# Patient Record
Sex: FEMALE | Race: WHITE | NOT HISPANIC OR LATINO
[De-identification: names, ages, dates, MRNs, and addresses within clinical notes are randomized per-mention and may not be internally consistent; named-entity substitution may affect disease eponyms.]

---

## 2021-02-24 PROBLEM — Z00.00 ENCOUNTER FOR PREVENTIVE HEALTH EXAMINATION: Status: ACTIVE | Noted: 2021-02-24

## 2021-03-01 ENCOUNTER — APPOINTMENT (OUTPATIENT)
Dept: SPINE | Facility: CLINIC | Age: 51
End: 2021-03-01
Payer: COMMERCIAL

## 2021-03-01 ENCOUNTER — RESULT REVIEW (OUTPATIENT)
Age: 51
End: 2021-03-01

## 2021-03-01 PROCEDURE — 99204 OFFICE O/P NEW MOD 45 MIN: CPT

## 2021-03-01 PROCEDURE — 99072 ADDL SUPL MATRL&STAF TM PHE: CPT

## 2021-03-02 VITALS — HEIGHT: 65 IN | BODY MASS INDEX: 23.32 KG/M2 | WEIGHT: 140 LBS

## 2021-03-02 NOTE — ASSESSMENT
[FreeTextEntry1] : Lumbar left sided radiculopathy .  Lumbar MRI  shows L 5 S 1 spondylolisthesis ,  grade II  with central and foraminal stenosis.  All non surgical measures were explained and may be considered.  Lumbar surgery was explained and if she  considers surgery a CT scan and flexions/extension lumbar xrays will be ordered.   Recovery and risks were explained along with alternatives.     \par \par \par \par CC\par Clint Mondragon MD\par [id number]\par [place] [address]

## 2021-03-02 NOTE — HISTORY OF PRESENT ILLNESS
[> 3 months] : more  than 3 months [FreeTextEntry1] : Lower back pain  [de-identified] : 50-year-old female who has been describing lower back pain since December 2020 and left leg pain .  The leg pain resolved spontaneously.  No fall or trauma.  Today the pain is reported at 7/10  No weakness.  No falls.  She has trouble with walking distances, standing,  and doing simple tasks.  No urine or stool incontinence.   She has no treatments for the back pain, no PT and no meds.  Lumbar MRI  shows L 5 S 1 spondylolisthesis ,  grade II  with central and foraminal stenosis.

## 2021-03-02 NOTE — CONSULT LETTER
[Dear  ___] : Dear  [unfilled], [Consult Letter:] : I had the pleasure of evaluating your patient, [unfilled]. [Please see my note below.] : Please see my note below. [Consult Closing:] : Thank you very much for allowing me to participate in the care of this patient.  If you have any questions, please do not hesitate to contact me. [Sincerely,] : Sincerely, [FreeTextEntry3] : Oswaldo Lundberg MD\par Chief of Neurosurgery [facility]\par [facility]\par

## 2021-03-02 NOTE — PHYSICAL EXAM
[Person] : oriented to person [Place] : oriented to place [Time] : oriented to time [Short Term Intact] : short term memory intact [Remote Intact] : remote memory intact [Span Intact] : the attention span was normal [Concentration Intact] : normal concentrating ability [Fluency] : fluency intact [Comprehension] : comprehension intact [Current Events] : adequate knowledge of current events [Past History] : adequate knowledge of personal past history [Vocabulary] : adequate range of vocabulary [Cranial Nerves Optic (II)] : visual acuity intact bilaterally,  pupils equal round and reactive to light [Cranial Nerves Oculomotor (III)] : extraocular motion intact [Cranial Nerves Trigeminal (V)] : facial sensation intact symmetrically [Cranial Nerves Facial (VII)] : face symmetrical [Cranial Nerves Vestibulocochlear (VIII)] : hearing was intact bilaterally [Cranial Nerves Glossopharyngeal (IX)] : tongue and palate midline [Cranial Nerves Accessory (XI - Cranial And Spinal)] : head turning and shoulder shrug symmetric [Cranial Nerves Hypoglossal (XII)] : there was no tongue deviation with protrusion [Motor Tone] : muscle tone was normal in all four extremities [Motor Strength] : muscle strength was normal in all four extremities [No Muscle Atrophy] : normal bulk in all four extremities [Sensation Tactile Decrease] : light touch was intact [Abnormal Walk] : normal gait [Balance] : balance was intact [2+] : Ankle jerk left 2+ [No Tenderness to Palpation] : no spine tenderness on palpation [Able to toe walk] : the patient was able to toe walk [Able to heel walk] : the patient was able to heel walk [Past-pointing] : there was no past-pointing [Tremor] : no tremor present [Plantar Reflex Right Only] : normal on the right [Plantar Reflex Left Only] : normal on the left [Deal] : Deal's sign was not demonstrated [Straight-Leg Raise Test - Left] : straight leg raise of the left leg was negative [Straight-Leg Raise Test - Right] : straight leg raise  of the right leg was negative

## 2021-03-02 NOTE — REASON FOR VISIT
[New Patient Visit] : a new patient visit [Referred By: _________] : Patient was referred by AKILA [FreeTextEntry1] : Lumbar Radiculopathy

## 2021-03-04 ENCOUNTER — TRANSCRIPTION ENCOUNTER (OUTPATIENT)
Age: 51
End: 2021-03-04

## 2021-03-22 ENCOUNTER — APPOINTMENT (OUTPATIENT)
Dept: CT IMAGING | Facility: IMAGING CENTER | Age: 51
End: 2021-03-22

## 2021-03-22 ENCOUNTER — APPOINTMENT (OUTPATIENT)
Dept: RADIOLOGY | Facility: IMAGING CENTER | Age: 51
End: 2021-03-22

## 2021-03-22 ENCOUNTER — OUTPATIENT (OUTPATIENT)
Dept: OUTPATIENT SERVICES | Facility: HOSPITAL | Age: 51
LOS: 1 days | End: 2021-03-22
Payer: COMMERCIAL

## 2021-03-22 VITALS
SYSTOLIC BLOOD PRESSURE: 132 MMHG | TEMPERATURE: 98 F | HEIGHT: 65 IN | WEIGHT: 210.98 LBS | HEART RATE: 80 BPM | DIASTOLIC BLOOD PRESSURE: 85 MMHG | RESPIRATION RATE: 14 BRPM | OXYGEN SATURATION: 98 %

## 2021-03-22 DIAGNOSIS — M54.16 RADICULOPATHY, LUMBAR REGION: ICD-10-CM

## 2021-03-22 DIAGNOSIS — Z98.891 HISTORY OF UTERINE SCAR FROM PREVIOUS SURGERY: Chronic | ICD-10-CM

## 2021-03-22 DIAGNOSIS — Z90.49 ACQUIRED ABSENCE OF OTHER SPECIFIED PARTS OF DIGESTIVE TRACT: Chronic | ICD-10-CM

## 2021-03-22 DIAGNOSIS — Z29.9 ENCOUNTER FOR PROPHYLACTIC MEASURES, UNSPECIFIED: ICD-10-CM

## 2021-03-22 DIAGNOSIS — Z01.818 ENCOUNTER FOR OTHER PREPROCEDURAL EXAMINATION: ICD-10-CM

## 2021-03-22 LAB
A1C WITH ESTIMATED AVERAGE GLUCOSE RESULT: 5.6 % — SIGNIFICANT CHANGE UP (ref 4–5.6)
ANION GAP SERPL CALC-SCNC: 10 MMOL/L — SIGNIFICANT CHANGE UP (ref 5–17)
BLD GP AB SCN SERPL QL: NEGATIVE — SIGNIFICANT CHANGE UP
BUN SERPL-MCNC: 11 MG/DL — SIGNIFICANT CHANGE UP (ref 7–23)
CALCIUM SERPL-MCNC: 9.3 MG/DL — SIGNIFICANT CHANGE UP (ref 8.4–10.5)
CHLORIDE SERPL-SCNC: 106 MMOL/L — SIGNIFICANT CHANGE UP (ref 96–108)
CO2 SERPL-SCNC: 25 MMOL/L — SIGNIFICANT CHANGE UP (ref 22–31)
CREAT SERPL-MCNC: 0.85 MG/DL — SIGNIFICANT CHANGE UP (ref 0.5–1.3)
ESTIMATED AVERAGE GLUCOSE: 114 MG/DL — SIGNIFICANT CHANGE UP (ref 68–114)
GLUCOSE SERPL-MCNC: 94 MG/DL — SIGNIFICANT CHANGE UP (ref 70–99)
HCT VFR BLD CALC: 43.7 % — SIGNIFICANT CHANGE UP (ref 34.5–45)
HGB BLD-MCNC: 14.1 G/DL — SIGNIFICANT CHANGE UP (ref 11.5–15.5)
MCHC RBC-ENTMCNC: 29.9 PG — SIGNIFICANT CHANGE UP (ref 27–34)
MCHC RBC-ENTMCNC: 32.3 GM/DL — SIGNIFICANT CHANGE UP (ref 32–36)
MCV RBC AUTO: 92.6 FL — SIGNIFICANT CHANGE UP (ref 80–100)
NRBC # BLD: 0 /100 WBCS — SIGNIFICANT CHANGE UP (ref 0–0)
PLATELET # BLD AUTO: 327 K/UL — SIGNIFICANT CHANGE UP (ref 150–400)
POTASSIUM SERPL-MCNC: 4.2 MMOL/L — SIGNIFICANT CHANGE UP (ref 3.5–5.3)
POTASSIUM SERPL-SCNC: 4.2 MMOL/L — SIGNIFICANT CHANGE UP (ref 3.5–5.3)
RBC # BLD: 4.72 M/UL — SIGNIFICANT CHANGE UP (ref 3.8–5.2)
RBC # FLD: 13.7 % — SIGNIFICANT CHANGE UP (ref 10.3–14.5)
RH IG SCN BLD-IMP: POSITIVE — SIGNIFICANT CHANGE UP
SODIUM SERPL-SCNC: 141 MMOL/L — SIGNIFICANT CHANGE UP (ref 135–145)
WBC # BLD: 9.19 K/UL — SIGNIFICANT CHANGE UP (ref 3.8–10.5)
WBC # FLD AUTO: 9.19 K/UL — SIGNIFICANT CHANGE UP (ref 3.8–10.5)

## 2021-03-22 PROCEDURE — 85027 COMPLETE CBC AUTOMATED: CPT

## 2021-03-22 PROCEDURE — 72131 CT LUMBAR SPINE W/O DYE: CPT

## 2021-03-22 PROCEDURE — 72082 X-RAY EXAM ENTIRE SPI 2/3 VW: CPT

## 2021-03-22 PROCEDURE — 86900 BLOOD TYPING SEROLOGIC ABO: CPT

## 2021-03-22 PROCEDURE — 86850 RBC ANTIBODY SCREEN: CPT

## 2021-03-22 PROCEDURE — G0463: CPT

## 2021-03-22 PROCEDURE — 80048 BASIC METABOLIC PNL TOTAL CA: CPT

## 2021-03-22 PROCEDURE — 83036 HEMOGLOBIN GLYCOSYLATED A1C: CPT

## 2021-03-22 PROCEDURE — 87640 STAPH A DNA AMP PROBE: CPT

## 2021-03-22 PROCEDURE — 72084 X-RAY EXAM ENTIRE SPI 6/> VW: CPT | Mod: 26

## 2021-03-22 PROCEDURE — 72131 CT LUMBAR SPINE W/O DYE: CPT | Mod: 26

## 2021-03-22 PROCEDURE — 86901 BLOOD TYPING SEROLOGIC RH(D): CPT

## 2021-03-22 PROCEDURE — 87641 MR-STAPH DNA AMP PROBE: CPT

## 2021-03-22 PROCEDURE — 72110 X-RAY EXAM L-2 SPINE 4/>VWS: CPT

## 2021-03-22 RX ORDER — CEFAZOLIN SODIUM 1 G
2000 VIAL (EA) INJECTION ONCE
Refills: 0 | Status: DISCONTINUED | OUTPATIENT
Start: 2021-03-30 | End: 2021-04-01

## 2021-03-22 NOTE — H&P PST ADULT - HEIGHT IN CM
[address] [place] [address].    DISCHARGE SUMMARY     Patient: Radha Moore                             Medical Record Number: [id number]                : 1966  Age: 46 y.o. Admit Date: 2018  Discharge Date: 2018  Admission Diagnosis: HNP   HNP (herniated nucleus pulposus), lumbar  Herniated nucleus pulposus  Discharge Diagnosis: HERNIATED NUCLEUS PULPOSA  Procedures: Procedure(s):  LUMBAR JUAN MANUEL-LAMINECTOMY DISCECTOMY L5-S1  Surgeon: Deny Swain MD  Anesthesia: general  Complications: None       Hospital Course:  Radha Moore was admitted the same day of surgery and underwent Procedure(s):  LUMBAR JUAN MANUEL-LAMINECTOMY DISCECTOMY L5-S1 and tolerated the procedure well. She was transferred  to the recovery room in stable condition. After a brief stay the patient was then transferred to the Spine Surgery Unit at [address] [place] [address].  On postoperative day #1, the dressing was clean and dry, she was neurovascularly intact. The patient was afebrile and vital signs were stable. Calves were soft and non-tender bilaterally. On postoperative day  # 1, the patient was tolerating a regular diet and making satisfactory progress with physical therapy. Hemoglobin and INR prior to discharge were   Lab Results   Component Value Date/Time    HGB 11.1 (L) 2018 04:36 AM    INR 1.0 2018 02:14 PM   .  Radha Moore made satisfactory progress with physical therapy and was discharged to Home in stable condition on postoperative day #1. She was provided with routine postoperative instructions and advised to follow up in my office in 2 weeks following discharge from the hospital.  She was prescribed pain medication for post operative analgesia.      Discharge Medications:  Current Discharge Medication List      START taking these medications    Details   oxyCODONE IR (ROXICODONE) 5 mg immediate release tablet Take 1 Tab by mouth every three (3) hours as needed. Max Daily Amount: 40 mg.  Qty: 50 Tab, Refills: 0    Associated Diagnoses: HNP (herniated nucleus pulposus), lumbar      senna-docusate (PERICOLACE) 8.6-50 mg per tablet Take 1 Tab by mouth two (2) times a day. Qty: 60 Tab, Refills: 0         CONTINUE these medications which have NOT CHANGED    Details   acetaminophen (TYLENOL EXTRA STRENGTH) 500 mg tablet Take 1,000 mg by mouth every six (6) hours as needed for Pain. naproxen sodium (ALEVE) 220 mg tablet Take 220 mg by mouth as needed.              Signed by: Piper Siddiqi PA-C  6/20/2018 165.1

## 2021-03-22 NOTE — H&P PST ADULT - ASSESSMENT
KIRSTENI VTE 2.0 SCORE [CLOT updated 2019]    AGE RELATED RISK FACTORS                                                       MOBILITY RELATED FACTORS  [ ] Age 41-60 years                                            (1 Point)                    [ ] Bed rest                                                        (1 Point)  [ ] Age: 61-74 years                                           (2 Points)                  [ ] Plaster cast                                                   (2 Points)  [ ] Age= 75 years                                              (3 Points)                    [ ] Bed bound for more than 72 hours                 (2 Points)    DISEASE RELATED RISK FACTORS                                               GENDER SPECIFIC FACTORS  [ ] Edema in the lower extremities                       (1 Point)              [ ] Pregnancy                                                     (1 Point)  [ ] Varicose veins                                               (1 Point)                     [ ] Post-partum < 6 weeks                                   (1 Point)             [ ] BMI > 25 Kg/m2                                            (1 Point)                     [ ] Hormonal therapy  or oral contraception          (1 Point)                 [ ] Sepsis (in the previous month)                        (1 Point)               [ ] History of pregnancy complications                 (1 point)  [ ] Pneumonia or serious lung disease                                               [ ] Unexplained or recurrent                     (1 Point)           (in the previous month)                               (1 Point)  [ ] Abnormal pulmonary function test                     (1 Point)                 SURGERY RELATED RISK FACTORS  [ ] Acute myocardial infarction                              (1 Point)               [ ]  Section                                             (1 Point)  [ ] Congestive heart failure (in the previous month)  (1 Point)      [ ] Minor surgery                                                  (1 Point)   [ ] Inflammatory bowel disease                             (1 Point)               [ ] Arthroscopic surgery                                        (2 Points)  [ ] Central venous access                                      (2 Points)                [ ] General surgery lasting more than 45 minutes (2 points)  [ ] Malignancy- Present or previous                   (2 Points)                [ ] Elective arthroplasty                                         (5 points)    [ ] Stroke (in the previous month)                          (5 Points)                                                                                                                                                           HEMATOLOGY RELATED FACTORS                                                 TRAUMA RELATED RISK FACTORS  [ ] Prior episodes of VTE                                     (3 Points)                [ ] Fracture of the hip, pelvis, or leg                       (5 Points)  [ ] Positive family history for VTE                         (3 Points)             [ ] Acute spinal cord injury (in the previous month)  (5 Points)  [ ] Prothrombin 12446 A                                     (3 Points)               [ ] Paralysis  (less than 1 month)                             (5 Points)  [ ] Factor V Leiden                                             (3 Points)                  [ ] Multiple Trauma within 1 month                        (5 Points)  [ ] Lupus anticoagulants                                     (3 Points)                                                           [ ] Anticardiolipin antibodies                               (3 Points)                                                       [ ] High homocysteine in the blood                      (3 Points)                                             [ ] Other congenital or acquired thrombophilia      (3 Points)                                                [ ] Heparin induced thrombocytopenia                  (3 Points)                                     Total Score [    3      ]

## 2021-03-22 NOTE — H&P PST ADULT - NSICDXPROBLEM_GEN_ALL_CORE_FT
PROBLEM DIAGNOSES  Problem: Lumbar radiculopathy  Assessment and Plan: Scheduled for L5-S1 Posterior Lumbar Fusion    Problem: Need for prophylactic measure  Assessment and Plan: The Caprini score indicates this patient is at risk for a VTE event (score 3-5).  Most surgical patients in this group would benefit from pharmacologic prophylaxis.  The surgical team will determine the balance between VTE risk and bleeding risk

## 2021-03-22 NOTE — H&P PST ADULT - HISTORY OF PRESENT ILLNESS
51 y/o HDL and cholecystectomy () and  28 years ago. Endorsed low back pain and on and off LLE pain for a few years but gotten worse since 2021.  Saw PCP and referred to Dr. Lundberg, MRI + for L2-S1 spondylolisthesis with central foramina stenosis. Pt reports 2/10 pain today. Denies fecal and urinary incontinence.    Denies Covid 19 infection, sick contacts or recent travel.   Echo and stress test  as part of regular physical exam

## 2021-03-23 PROBLEM — E78.5 HYPERLIPIDEMIA, UNSPECIFIED: Chronic | Status: ACTIVE | Noted: 2021-03-22

## 2021-03-23 LAB
MRSA PCR RESULT.: SIGNIFICANT CHANGE UP
S AUREUS DNA NOSE QL NAA+PROBE: SIGNIFICANT CHANGE UP

## 2021-03-27 ENCOUNTER — OUTPATIENT (OUTPATIENT)
Dept: OUTPATIENT SERVICES | Facility: HOSPITAL | Age: 51
LOS: 1 days | End: 2021-03-27
Payer: COMMERCIAL

## 2021-03-27 DIAGNOSIS — Z98.891 HISTORY OF UTERINE SCAR FROM PREVIOUS SURGERY: Chronic | ICD-10-CM

## 2021-03-27 DIAGNOSIS — Z90.49 ACQUIRED ABSENCE OF OTHER SPECIFIED PARTS OF DIGESTIVE TRACT: Chronic | ICD-10-CM

## 2021-03-27 DIAGNOSIS — Z11.52 ENCOUNTER FOR SCREENING FOR COVID-19: ICD-10-CM

## 2021-03-27 LAB — SARS-COV-2 RNA SPEC QL NAA+PROBE: SIGNIFICANT CHANGE UP

## 2021-03-27 PROCEDURE — C9803: CPT

## 2021-03-27 PROCEDURE — U0005: CPT

## 2021-03-27 PROCEDURE — U0003: CPT

## 2021-03-29 ENCOUNTER — TRANSCRIPTION ENCOUNTER (OUTPATIENT)
Age: 51
End: 2021-03-29

## 2021-03-30 ENCOUNTER — APPOINTMENT (OUTPATIENT)
Dept: SPINE | Facility: HOSPITAL | Age: 51
End: 2021-03-30

## 2021-03-30 ENCOUNTER — INPATIENT (INPATIENT)
Facility: HOSPITAL | Age: 51
LOS: 4 days | Discharge: HOME | DRG: 454 | End: 2021-04-04
Attending: NEUROLOGICAL SURGERY | Admitting: NEUROLOGICAL SURGERY
Payer: COMMERCIAL

## 2021-03-30 VITALS
HEART RATE: 71 BPM | RESPIRATION RATE: 16 BRPM | TEMPERATURE: 98 F | WEIGHT: 210.98 LBS | SYSTOLIC BLOOD PRESSURE: 119 MMHG | OXYGEN SATURATION: 98 % | DIASTOLIC BLOOD PRESSURE: 76 MMHG | HEIGHT: 65 IN

## 2021-03-30 DIAGNOSIS — M54.16 RADICULOPATHY, LUMBAR REGION: ICD-10-CM

## 2021-03-30 DIAGNOSIS — Z90.49 ACQUIRED ABSENCE OF OTHER SPECIFIED PARTS OF DIGESTIVE TRACT: Chronic | ICD-10-CM

## 2021-03-30 DIAGNOSIS — Z98.891 HISTORY OF UTERINE SCAR FROM PREVIOUS SURGERY: Chronic | ICD-10-CM

## 2021-03-30 LAB
ANION GAP SERPL CALC-SCNC: 12 MMOL/L — SIGNIFICANT CHANGE UP (ref 5–17)
BASOPHILS # BLD AUTO: 0.02 K/UL — SIGNIFICANT CHANGE UP (ref 0–0.2)
BASOPHILS NFR BLD AUTO: 0.1 % — SIGNIFICANT CHANGE UP (ref 0–2)
BUN SERPL-MCNC: 12 MG/DL — SIGNIFICANT CHANGE UP (ref 7–23)
CALCIUM SERPL-MCNC: 8.2 MG/DL — LOW (ref 8.4–10.5)
CHLORIDE SERPL-SCNC: 104 MMOL/L — SIGNIFICANT CHANGE UP (ref 96–108)
CO2 SERPL-SCNC: 21 MMOL/L — LOW (ref 22–31)
CREAT SERPL-MCNC: 0.71 MG/DL — SIGNIFICANT CHANGE UP (ref 0.5–1.3)
EOSINOPHIL # BLD AUTO: 0.01 K/UL — SIGNIFICANT CHANGE UP (ref 0–0.5)
EOSINOPHIL NFR BLD AUTO: 0.1 % — SIGNIFICANT CHANGE UP (ref 0–6)
GLUCOSE BLDC GLUCOMTR-MCNC: 97 MG/DL — SIGNIFICANT CHANGE UP (ref 70–99)
GLUCOSE SERPL-MCNC: 147 MG/DL — HIGH (ref 70–99)
HCG UR QL: NEGATIVE — SIGNIFICANT CHANGE UP
HCT VFR BLD CALC: 36.2 % — SIGNIFICANT CHANGE UP (ref 34.5–45)
HCT VFR BLD CALC: 37.2 % — SIGNIFICANT CHANGE UP (ref 34.5–45)
HGB BLD-MCNC: 11.7 G/DL — SIGNIFICANT CHANGE UP (ref 11.5–15.5)
HGB BLD-MCNC: 11.9 G/DL — SIGNIFICANT CHANGE UP (ref 11.5–15.5)
IMM GRANULOCYTES NFR BLD AUTO: 0.7 % — SIGNIFICANT CHANGE UP (ref 0–1.5)
LYMPHOCYTES # BLD AUTO: 1.5 K/UL — SIGNIFICANT CHANGE UP (ref 1–3.3)
LYMPHOCYTES # BLD AUTO: 9.9 % — LOW (ref 13–44)
MCHC RBC-ENTMCNC: 29.5 PG — SIGNIFICANT CHANGE UP (ref 27–34)
MCHC RBC-ENTMCNC: 29.9 PG — SIGNIFICANT CHANGE UP (ref 27–34)
MCHC RBC-ENTMCNC: 32 GM/DL — SIGNIFICANT CHANGE UP (ref 32–36)
MCHC RBC-ENTMCNC: 32.3 GM/DL — SIGNIFICANT CHANGE UP (ref 32–36)
MCV RBC AUTO: 92.1 FL — SIGNIFICANT CHANGE UP (ref 80–100)
MCV RBC AUTO: 92.6 FL — SIGNIFICANT CHANGE UP (ref 80–100)
MONOCYTES # BLD AUTO: 0.16 K/UL — SIGNIFICANT CHANGE UP (ref 0–0.9)
MONOCYTES NFR BLD AUTO: 1.1 % — LOW (ref 2–14)
NEUTROPHILS # BLD AUTO: 13.3 K/UL — HIGH (ref 1.8–7.4)
NEUTROPHILS NFR BLD AUTO: 88.1 % — HIGH (ref 43–77)
NRBC # BLD: 0 /100 WBCS — SIGNIFICANT CHANGE UP (ref 0–0)
NRBC # BLD: 0 /100 WBCS — SIGNIFICANT CHANGE UP (ref 0–0)
PLATELET # BLD AUTO: 265 K/UL — SIGNIFICANT CHANGE UP (ref 150–400)
PLATELET # BLD AUTO: 286 K/UL — SIGNIFICANT CHANGE UP (ref 150–400)
POTASSIUM SERPL-MCNC: 4.1 MMOL/L — SIGNIFICANT CHANGE UP (ref 3.5–5.3)
POTASSIUM SERPL-SCNC: 4.1 MMOL/L — SIGNIFICANT CHANGE UP (ref 3.5–5.3)
RBC # BLD: 3.91 M/UL — SIGNIFICANT CHANGE UP (ref 3.8–5.2)
RBC # BLD: 4.04 M/UL — SIGNIFICANT CHANGE UP (ref 3.8–5.2)
RBC # FLD: 13.3 % — SIGNIFICANT CHANGE UP (ref 10.3–14.5)
RBC # FLD: 13.4 % — SIGNIFICANT CHANGE UP (ref 10.3–14.5)
RH IG SCN BLD-IMP: POSITIVE — SIGNIFICANT CHANGE UP
SODIUM SERPL-SCNC: 137 MMOL/L — SIGNIFICANT CHANGE UP (ref 135–145)
WBC # BLD: 13.38 K/UL — HIGH (ref 3.8–10.5)
WBC # BLD: 15.09 K/UL — HIGH (ref 3.8–10.5)
WBC # FLD AUTO: 13.38 K/UL — HIGH (ref 3.8–10.5)
WBC # FLD AUTO: 15.09 K/UL — HIGH (ref 3.8–10.5)

## 2021-03-30 PROCEDURE — 63047 LAM FACETEC & FORAMOT LUMBAR: CPT | Mod: 59,GC

## 2021-03-30 PROCEDURE — 22614 ARTHRD PST TQ 1NTRSPC EA ADD: CPT | Mod: GC

## 2021-03-30 PROCEDURE — 63048 LAM FACETEC &FORAMOT EA ADDL: CPT | Mod: 59,GC

## 2021-03-30 PROCEDURE — 22842 INSERT SPINE FIXATION DEVICE: CPT | Mod: GC

## 2021-03-30 PROCEDURE — 22633 ARTHRD CMBN 1NTRSPC LUMBAR: CPT | Mod: GC

## 2021-03-30 PROCEDURE — 22853 INSJ BIOMECHANICAL DEVICE: CPT | Mod: GC

## 2021-03-30 RX ORDER — ACETAMINOPHEN 500 MG
1000 TABLET ORAL EVERY 6 HOURS
Refills: 0 | Status: DISCONTINUED | OUTPATIENT
Start: 2021-03-30 | End: 2021-03-30

## 2021-03-30 RX ORDER — SODIUM CHLORIDE 9 MG/ML
500 INJECTION INTRAMUSCULAR; INTRAVENOUS; SUBCUTANEOUS ONCE
Refills: 0 | Status: COMPLETED | OUTPATIENT
Start: 2021-03-30 | End: 2021-03-30

## 2021-03-30 RX ORDER — INFLUENZA VIRUS VACCINE 15; 15; 15; 15 UG/.5ML; UG/.5ML; UG/.5ML; UG/.5ML
0.5 SUSPENSION INTRAMUSCULAR ONCE
Refills: 0 | Status: COMPLETED | OUTPATIENT
Start: 2021-03-30 | End: 2021-03-30

## 2021-03-30 RX ORDER — CHLORHEXIDINE GLUCONATE 213 G/1000ML
1 SOLUTION TOPICAL ONCE
Refills: 0 | Status: COMPLETED | OUTPATIENT
Start: 2021-03-30 | End: 2021-03-30

## 2021-03-30 RX ORDER — SENNA PLUS 8.6 MG/1
2 TABLET ORAL AT BEDTIME
Refills: 0 | Status: DISCONTINUED | OUTPATIENT
Start: 2021-03-30 | End: 2021-04-04

## 2021-03-30 RX ORDER — DEXTROSE MONOHYDRATE, SODIUM CHLORIDE, AND POTASSIUM CHLORIDE 50; .745; 4.5 G/1000ML; G/1000ML; G/1000ML
1000 INJECTION, SOLUTION INTRAVENOUS
Refills: 0 | Status: ACTIVE | OUTPATIENT
Start: 2021-03-30 | End: 2022-02-26

## 2021-03-30 RX ORDER — HYDROMORPHONE HYDROCHLORIDE 2 MG/ML
0.5 INJECTION INTRAMUSCULAR; INTRAVENOUS; SUBCUTANEOUS
Refills: 0 | Status: DISCONTINUED | OUTPATIENT
Start: 2021-03-30 | End: 2021-03-31

## 2021-03-30 RX ORDER — ONDANSETRON 8 MG/1
4 TABLET, FILM COATED ORAL EVERY 6 HOURS
Refills: 0 | Status: DISCONTINUED | OUTPATIENT
Start: 2021-03-30 | End: 2021-04-04

## 2021-03-30 RX ORDER — LIDOCAINE HCL 20 MG/ML
0.2 VIAL (ML) INJECTION ONCE
Refills: 0 | Status: COMPLETED | OUTPATIENT
Start: 2021-03-30 | End: 2021-03-30

## 2021-03-30 RX ORDER — HYDROMORPHONE HYDROCHLORIDE 2 MG/ML
30 INJECTION INTRAMUSCULAR; INTRAVENOUS; SUBCUTANEOUS
Refills: 0 | Status: DISCONTINUED | OUTPATIENT
Start: 2021-03-30 | End: 2021-03-31

## 2021-03-30 RX ORDER — SODIUM CHLORIDE 9 MG/ML
3 INJECTION INTRAMUSCULAR; INTRAVENOUS; SUBCUTANEOUS EVERY 8 HOURS
Refills: 0 | Status: DISCONTINUED | OUTPATIENT
Start: 2021-03-30 | End: 2021-03-30

## 2021-03-30 RX ORDER — ENOXAPARIN SODIUM 100 MG/ML
40 INJECTION SUBCUTANEOUS DAILY
Refills: 0 | Status: DISCONTINUED | OUTPATIENT
Start: 2021-03-31 | End: 2021-04-04

## 2021-03-30 RX ORDER — HYDROMORPHONE HYDROCHLORIDE 2 MG/ML
0.5 INJECTION INTRAMUSCULAR; INTRAVENOUS; SUBCUTANEOUS
Refills: 0 | Status: DISCONTINUED | OUTPATIENT
Start: 2021-03-30 | End: 2021-03-30

## 2021-03-30 RX ORDER — ACETAMINOPHEN 500 MG
650 TABLET ORAL EVERY 6 HOURS
Refills: 0 | Status: DISCONTINUED | OUTPATIENT
Start: 2021-03-30 | End: 2021-04-02

## 2021-03-30 RX ORDER — METOCLOPRAMIDE HCL 10 MG
10 TABLET ORAL ONCE
Refills: 0 | Status: COMPLETED | OUTPATIENT
Start: 2021-03-30 | End: 2021-03-30

## 2021-03-30 RX ORDER — ONDANSETRON 8 MG/1
4 TABLET, FILM COATED ORAL ONCE
Refills: 0 | Status: DISCONTINUED | OUTPATIENT
Start: 2021-03-30 | End: 2021-03-30

## 2021-03-30 RX ORDER — NALOXONE HYDROCHLORIDE 4 MG/.1ML
0.1 SPRAY NASAL
Refills: 0 | Status: DISCONTINUED | OUTPATIENT
Start: 2021-03-30 | End: 2021-03-31

## 2021-03-30 RX ORDER — DIAZEPAM 5 MG
5 TABLET ORAL EVERY 6 HOURS
Refills: 0 | Status: DISCONTINUED | OUTPATIENT
Start: 2021-03-30 | End: 2021-04-02

## 2021-03-30 RX ORDER — CEFAZOLIN SODIUM 1 G
2000 VIAL (EA) INJECTION EVERY 8 HOURS
Refills: 0 | Status: COMPLETED | OUTPATIENT
Start: 2021-03-30 | End: 2021-03-31

## 2021-03-30 RX ADMIN — HYDROMORPHONE HYDROCHLORIDE 0.5 MILLIGRAM(S): 2 INJECTION INTRAMUSCULAR; INTRAVENOUS; SUBCUTANEOUS at 14:25

## 2021-03-30 RX ADMIN — HYDROMORPHONE HYDROCHLORIDE 30 MILLILITER(S): 2 INJECTION INTRAMUSCULAR; INTRAVENOUS; SUBCUTANEOUS at 17:18

## 2021-03-30 RX ADMIN — CHLORHEXIDINE GLUCONATE 1 APPLICATION(S): 213 SOLUTION TOPICAL at 07:06

## 2021-03-30 RX ADMIN — ONDANSETRON 4 MILLIGRAM(S): 8 TABLET, FILM COATED ORAL at 18:13

## 2021-03-30 RX ADMIN — SODIUM CHLORIDE 3 MILLILITER(S): 9 INJECTION INTRAMUSCULAR; INTRAVENOUS; SUBCUTANEOUS at 07:07

## 2021-03-30 RX ADMIN — DEXTROSE MONOHYDRATE, SODIUM CHLORIDE, AND POTASSIUM CHLORIDE 75 MILLILITER(S): 50; .745; 4.5 INJECTION, SOLUTION INTRAVENOUS at 16:36

## 2021-03-30 RX ADMIN — HYDROMORPHONE HYDROCHLORIDE 30 MILLILITER(S): 2 INJECTION INTRAMUSCULAR; INTRAVENOUS; SUBCUTANEOUS at 19:35

## 2021-03-30 RX ADMIN — HYDROMORPHONE HYDROCHLORIDE 0.5 MILLIGRAM(S): 2 INJECTION INTRAMUSCULAR; INTRAVENOUS; SUBCUTANEOUS at 14:10

## 2021-03-30 RX ADMIN — HYDROMORPHONE HYDROCHLORIDE 30 MILLILITER(S): 2 INJECTION INTRAMUSCULAR; INTRAVENOUS; SUBCUTANEOUS at 16:37

## 2021-03-30 RX ADMIN — Medication 100 MILLIGRAM(S): at 23:09

## 2021-03-30 RX ADMIN — Medication 0.2 MILLILITER(S): at 07:06

## 2021-03-30 RX ADMIN — SODIUM CHLORIDE 500 MILLILITER(S): 9 INJECTION INTRAMUSCULAR; INTRAVENOUS; SUBCUTANEOUS at 21:05

## 2021-03-30 RX ADMIN — Medication 10 MILLIGRAM(S): at 20:06

## 2021-03-30 RX ADMIN — HYDROMORPHONE HYDROCHLORIDE 30 MILLILITER(S): 2 INJECTION INTRAMUSCULAR; INTRAVENOUS; SUBCUTANEOUS at 14:16

## 2021-03-30 NOTE — PHYSICAL THERAPY INITIAL EVALUATION ADULT - BALANCE TRAINING, PT EVAL
GOAL: Pt will increase static/dynamic sitting and standing balance to at least "good" within 2 weeks

## 2021-03-30 NOTE — PHYSICAL THERAPY INITIAL EVALUATION ADULT - PLANNED THERAPY INTERVENTIONS, PT EVAL
GOAL: Pt will negotiate 9 steps with unilateral handrail in a step-to pattern independently within 2 weeks/balance training/bed mobility training/gait training/strengthening/transfer training

## 2021-03-30 NOTE — PHYSICAL THERAPY INITIAL EVALUATION ADULT - BED MOBILITY LIMITATIONS, REHAB EVAL
supine to sit via logroll/decreased ability to use legs for bridging/pushing/impaired ability to control trunk for mobility

## 2021-03-30 NOTE — PHYSICAL THERAPY INITIAL EVALUATION ADULT - ADDITIONAL COMMENTS
pt states she lives in a private home with family with a flight of steps to enter and a flight of steps inside (+handrail). Pt states prior to admission being independent with all functional mobility and ADLs. pt denies using any devices for ambulation. pt reports driving and working prior to admission.

## 2021-03-30 NOTE — PRE-ANESTHESIA EVALUATION ADULT - NSANTHPMHFT_GEN_ALL_CORE
low back pain and on and off LLE pain for a few years but gotten worse since 12/2021. No neurological symptoms. Intermittent LLE pain for a few years but has gotten worse since 12/2021. Denies neurological symptoms.

## 2021-03-30 NOTE — PHYSICAL THERAPY INITIAL EVALUATION ADULT - GENERAL OBSERVATIONS, REHAB EVAL
pt silvana 35 min eval poor. pt rec'd in bed, hemovac, PCA, peripheral IV line, 2L NC, pulse ox, NAD. pt appeared tired, falling asleep at times but arousable.

## 2021-03-30 NOTE — PRE-ANESTHESIA EVALUATION ADULT - NSANTHOSAYNRD_GEN_A_CORE
No. BLACK screening performed.  STOP BANG Legend: 0-2 = LOW Risk; 3-4 = INTERMEDIATE Risk; 5-8 = HIGH Risk

## 2021-03-31 LAB
ANION GAP SERPL CALC-SCNC: 8 MMOL/L — SIGNIFICANT CHANGE UP (ref 5–17)
BASOPHILS # BLD AUTO: 0.01 K/UL — SIGNIFICANT CHANGE UP (ref 0–0.2)
BASOPHILS NFR BLD AUTO: 0.1 % — SIGNIFICANT CHANGE UP (ref 0–2)
BUN SERPL-MCNC: 11 MG/DL — SIGNIFICANT CHANGE UP (ref 7–23)
CALCIUM SERPL-MCNC: 8.6 MG/DL — SIGNIFICANT CHANGE UP (ref 8.4–10.5)
CHLORIDE SERPL-SCNC: 107 MMOL/L — SIGNIFICANT CHANGE UP (ref 96–108)
CO2 SERPL-SCNC: 25 MMOL/L — SIGNIFICANT CHANGE UP (ref 22–31)
COVID-19 SPIKE DOMAIN AB INTERP: NEGATIVE — SIGNIFICANT CHANGE UP
COVID-19 SPIKE DOMAIN ANTIBODY RESULT: 0.4 U/ML — SIGNIFICANT CHANGE UP
CREAT SERPL-MCNC: 0.71 MG/DL — SIGNIFICANT CHANGE UP (ref 0.5–1.3)
EOSINOPHIL # BLD AUTO: 0 K/UL — SIGNIFICANT CHANGE UP (ref 0–0.5)
EOSINOPHIL NFR BLD AUTO: 0 % — SIGNIFICANT CHANGE UP (ref 0–6)
GLUCOSE SERPL-MCNC: 115 MG/DL — HIGH (ref 70–99)
HCT VFR BLD CALC: 33.4 % — LOW (ref 34.5–45)
HGB BLD-MCNC: 10.5 G/DL — LOW (ref 11.5–15.5)
IMM GRANULOCYTES NFR BLD AUTO: 0.6 % — SIGNIFICANT CHANGE UP (ref 0–1.5)
LYMPHOCYTES # BLD AUTO: 1.8 K/UL — SIGNIFICANT CHANGE UP (ref 1–3.3)
LYMPHOCYTES # BLD AUTO: 14.2 % — SIGNIFICANT CHANGE UP (ref 13–44)
MCHC RBC-ENTMCNC: 29.3 PG — SIGNIFICANT CHANGE UP (ref 27–34)
MCHC RBC-ENTMCNC: 31.4 GM/DL — LOW (ref 32–36)
MCV RBC AUTO: 93.3 FL — SIGNIFICANT CHANGE UP (ref 80–100)
MONOCYTES # BLD AUTO: 0.72 K/UL — SIGNIFICANT CHANGE UP (ref 0–0.9)
MONOCYTES NFR BLD AUTO: 5.7 % — SIGNIFICANT CHANGE UP (ref 2–14)
NEUTROPHILS # BLD AUTO: 10.12 K/UL — HIGH (ref 1.8–7.4)
NEUTROPHILS NFR BLD AUTO: 79.4 % — HIGH (ref 43–77)
NRBC # BLD: 0 /100 WBCS — SIGNIFICANT CHANGE UP (ref 0–0)
PLATELET # BLD AUTO: 280 K/UL — SIGNIFICANT CHANGE UP (ref 150–400)
POTASSIUM SERPL-MCNC: 4.8 MMOL/L — SIGNIFICANT CHANGE UP (ref 3.5–5.3)
POTASSIUM SERPL-SCNC: 4.8 MMOL/L — SIGNIFICANT CHANGE UP (ref 3.5–5.3)
RBC # BLD: 3.58 M/UL — LOW (ref 3.8–5.2)
RBC # FLD: 13.6 % — SIGNIFICANT CHANGE UP (ref 10.3–14.5)
SARS-COV-2 IGG+IGM SERPL QL IA: 0.4 U/ML — SIGNIFICANT CHANGE UP
SARS-COV-2 IGG+IGM SERPL QL IA: NEGATIVE — SIGNIFICANT CHANGE UP
SODIUM SERPL-SCNC: 140 MMOL/L — SIGNIFICANT CHANGE UP (ref 135–145)
WBC # BLD: 12.72 K/UL — HIGH (ref 3.8–10.5)
WBC # FLD AUTO: 12.72 K/UL — HIGH (ref 3.8–10.5)

## 2021-03-31 PROCEDURE — 93970 EXTREMITY STUDY: CPT | Mod: 26

## 2021-03-31 RX ORDER — OXYCODONE HYDROCHLORIDE 5 MG/1
5 TABLET ORAL EVERY 4 HOURS
Refills: 0 | Status: DISCONTINUED | OUTPATIENT
Start: 2021-03-31 | End: 2021-04-02

## 2021-03-31 RX ORDER — POLYETHYLENE GLYCOL 3350 17 G/17G
17 POWDER, FOR SOLUTION ORAL
Refills: 0 | Status: DISCONTINUED | OUTPATIENT
Start: 2021-03-31 | End: 2021-04-02

## 2021-03-31 RX ORDER — OXYCODONE HYDROCHLORIDE 5 MG/1
10 TABLET ORAL EVERY 4 HOURS
Refills: 0 | Status: DISCONTINUED | OUTPATIENT
Start: 2021-03-31 | End: 2021-04-02

## 2021-03-31 RX ORDER — HYDROMORPHONE HYDROCHLORIDE 2 MG/ML
0.5 INJECTION INTRAMUSCULAR; INTRAVENOUS; SUBCUTANEOUS
Refills: 0 | Status: DISCONTINUED | OUTPATIENT
Start: 2021-03-31 | End: 2021-04-01

## 2021-03-31 RX ADMIN — OXYCODONE HYDROCHLORIDE 10 MILLIGRAM(S): 5 TABLET ORAL at 21:58

## 2021-03-31 RX ADMIN — OXYCODONE HYDROCHLORIDE 10 MILLIGRAM(S): 5 TABLET ORAL at 17:03

## 2021-03-31 RX ADMIN — OXYCODONE HYDROCHLORIDE 10 MILLIGRAM(S): 5 TABLET ORAL at 17:26

## 2021-03-31 RX ADMIN — OXYCODONE HYDROCHLORIDE 10 MILLIGRAM(S): 5 TABLET ORAL at 13:42

## 2021-03-31 RX ADMIN — Medication 100 MILLIGRAM(S): at 05:21

## 2021-03-31 RX ADMIN — ENOXAPARIN SODIUM 40 MILLIGRAM(S): 100 INJECTION SUBCUTANEOUS at 12:59

## 2021-03-31 RX ADMIN — Medication 100 MILLIGRAM(S): at 13:17

## 2021-03-31 RX ADMIN — OXYCODONE HYDROCHLORIDE 10 MILLIGRAM(S): 5 TABLET ORAL at 22:30

## 2021-03-31 RX ADMIN — OXYCODONE HYDROCHLORIDE 10 MILLIGRAM(S): 5 TABLET ORAL at 13:16

## 2021-03-31 RX ADMIN — HYDROMORPHONE HYDROCHLORIDE 30 MILLILITER(S): 2 INJECTION INTRAMUSCULAR; INTRAVENOUS; SUBCUTANEOUS at 07:39

## 2021-03-31 NOTE — PROGRESS NOTE ADULT - SUBJECTIVE AND OBJECTIVE BOX
Post-op day 3 1 s/p L4-S1 PLIF    OVERNIGHT EVENTS: no acute event    Vital Signs Last 24 Hrs  T(C): 36.8 (31 Mar 2021 04:31), Max: 36.9 (30 Mar 2021 13:55)  T(F): 98.2 (31 Mar 2021 04:31), Max: 98.4 (30 Mar 2021 13:55)  HR: 76 (31 Mar 2021 10:08) (63 - 87)  BP: 120/67 (31 Mar 2021 10:08) (94/59 - 150/75)  BP(mean): 97 (30 Mar 2021 16:15) (82 - 97)  RR: 16 (31 Mar 2021 04:31) (16 - 18)  SpO2: 98% (31 Mar 2021 10:08) (96% - 100%)    I&O's Detail    30 Mar 2021 07:01  -  31 Mar 2021 07:00  --------------------------------------------------------  IN:    IV PiggyBack: 100 mL    Oral Fluid: 600 mL    Sodium Chloride 0.9% Bolus: 500 mL    sodium chloride 0.9% w/ Additives: 1125 mL  Total IN: 2325 mL    OUT:    Accordian (mL): 330 mL    Indwelling Catheter - Urethral (mL): 1440 mL  Total OUT: 1770 mL    Total NET: 555 mL      31 Mar 2021 07:01  -  31 Mar 2021 11:12  --------------------------------------------------------  IN:    Oral Fluid: 450 mL  Total IN: 450 mL    OUT:    Accordian (mL): 70 mL    Indwelling Catheter - Urethral (mL): 750 mL  Total OUT: 820 mL    Total NET: -370 mL        I&O's Summary    30 Mar 2021 07:01  -  31 Mar 2021 07:00  --------------------------------------------------------  IN: 2325 mL / OUT: 1770 mL / NET: 555 mL    31 Mar 2021 07:01  -  31 Mar 2021 11:12  --------------------------------------------------------  IN: 450 mL / OUT: 820 mL / NET: -370 mL        PHYSICAL EXAM:  Neurological:    Motor exam:         [x] Upper extremity                 D             B          T          WE       WF      HI                                               R         5/5        5/5        5/5       5/5     5/5       5/5                                               L          5/5        5/5        5/5       5/5     5/5       5/5         [x] Lower extremity                Ps          Ha        Quad    EHL        FHL                                               R        5/5        5/5        5/5       5/5         5/5                                               L         5/5        5/5       5/5       5/5          5/5                                                      Sensation: [x] intact to light touch  [] decreased:       Gait    Cardiovascular: Regular  Respiratory: Clear bilaterally  Gastrointestinal: Abd soft + BS non tender  Genitourinary:  Extremities: -C/C/E  Incision/Wound: Intact    TUBES/LINES:  [] CVC  [] A-line  [] Lumbar Drain  [] Ventriculostomy  [] Other    DIET: Regular  [] NPO  [] Mechanical  [] Tube feeds    LABS:                        10.5   12.72 )-----------( 280      ( 31 Mar 2021 06:45 )             33.4     03-31    140  |  107  |  11  ----------------------------<  115<H>  4.8   |  25  |  0.71    Ca    8.6      31 Mar 2021 06:43              CAPILLARY BLOOD GLUCOSE              Drug Levels: [] N/A    CSF Analysis: [] N/A      Allergies    No Known Allergies    Intolerances      MEDICATIONS:  Antibiotics:  ceFAZolin   IVPB 2000 milliGRAM(s) IV Intermittent once  ceFAZolin   IVPB 2000 milliGRAM(s) IV Intermittent every 8 hours    Neuro:  acetaminophen   Tablet .. 650 milliGRAM(s) Oral every 6 hours PRN  diazepam    Tablet 5 milliGRAM(s) Oral every 6 hours PRN  HYDROmorphone PCA (1 mG/mL) 30 milliLiter(s) PCA Continuous PCA Continuous  HYDROmorphone PCA (1 mG/mL) Rescue Clinician Bolus 0.5 milliGRAM(s) IV Push every 15 minutes PRN  ondansetron Injectable 4 milliGRAM(s) IV Push every 6 hours PRN    Anticoagulation:  enoxaparin Injectable 40 milliGRAM(s) SubCutaneous daily    OTHER:  naloxone Injectable 0.1 milliGRAM(s) IV Push every 3 minutes PRN  senna 2 Tablet(s) Oral at bedtime    IVF:  multivitamin 1 Tablet(s) Oral daily  sodium chloride 0.9% with potassium chloride 20 mEq/L 1000 milliLiter(s) IV Continuous <Continuous>    CULTURES:    RADIOLOGY & ADDITIONAL TESTS:       Post-op day # 1 s/p L4-S1 PLIF    OVERNIGHT EVENTS: no acute event    Vital Signs Last 24 Hrs  T(C): 36.8 (31 Mar 2021 04:31), Max: 36.9 (30 Mar 2021 13:55)  T(F): 98.2 (31 Mar 2021 04:31), Max: 98.4 (30 Mar 2021 13:55)  HR: 76 (31 Mar 2021 10:08) (63 - 87)  BP: 120/67 (31 Mar 2021 10:08) (94/59 - 150/75)  BP(mean): 97 (30 Mar 2021 16:15) (82 - 97)  RR: 16 (31 Mar 2021 04:31) (16 - 18)  SpO2: 98% (31 Mar 2021 10:08) (96% - 100%)    I&O's Detail    30 Mar 2021 07:01  -  31 Mar 2021 07:00  --------------------------------------------------------  IN:    IV PiggyBack: 100 mL    Oral Fluid: 600 mL    Sodium Chloride 0.9% Bolus: 500 mL    sodium chloride 0.9% w/ Additives: 1125 mL  Total IN: 2325 mL    OUT:    Accordian (mL): 330 mL    Indwelling Catheter - Urethral (mL): 1440 mL  Total OUT: 1770 mL    Total NET: 555 mL      31 Mar 2021 07:01  -  31 Mar 2021 11:12  --------------------------------------------------------  IN:    Oral Fluid: 450 mL  Total IN: 450 mL    OUT:    Accordian (mL): 70 mL    Indwelling Catheter - Urethral (mL): 750 mL  Total OUT: 820 mL    Total NET: -370 mL        I&O's Summary    30 Mar 2021 07:01  -  31 Mar 2021 07:00  --------------------------------------------------------  IN: 2325 mL / OUT: 1770 mL / NET: 555 mL    31 Mar 2021 07:01  -  31 Mar 2021 11:12  --------------------------------------------------------  IN: 450 mL / OUT: 820 mL / NET: -370 mL        PHYSICAL EXAM:  Neurological:    Motor exam:         [x] Upper extremity                 D             B          T          WE       WF      HI                                               R         5/5        5/5        5/5       5/5     5/5       5/5                                               L          5/5        5/5        5/5       5/5     5/5       5/5         [x] Lower extremity                Ps          Ha        Quad    EHL        FHL                                               R        5/5        5/5        5/5       5/5         5/5                                               L         5/5        5/5       5/5       5/5          5/5                                                      Sensation: [x] intact to light touch  [] decreased:       Gait    Cardiovascular: Regular  Respiratory: Clear bilaterally  Gastrointestinal: Abd soft + BS non tender  Genitourinary:  Extremities: -C/C/E  Incision/Wound: Intact    TUBES/LINES:  [] CVC  [] A-line  [] Lumbar Drain  [] Ventriculostomy  [] Other    DIET: Regular  [] NPO  [] Mechanical  [] Tube feeds    LABS:                        10.5   12.72 )-----------( 280      ( 31 Mar 2021 06:45 )             33.4     03-31    140  |  107  |  11  ----------------------------<  115<H>  4.8   |  25  |  0.71    Ca    8.6      31 Mar 2021 06:43              CAPILLARY BLOOD GLUCOSE              Drug Levels: [] N/A    CSF Analysis: [] N/A      Allergies    No Known Allergies    Intolerances      MEDICATIONS:  Antibiotics:  ceFAZolin   IVPB 2000 milliGRAM(s) IV Intermittent once  ceFAZolin   IVPB 2000 milliGRAM(s) IV Intermittent every 8 hours    Neuro:  acetaminophen   Tablet .. 650 milliGRAM(s) Oral every 6 hours PRN  diazepam    Tablet 5 milliGRAM(s) Oral every 6 hours PRN  HYDROmorphone PCA (1 mG/mL) 30 milliLiter(s) PCA Continuous PCA Continuous  HYDROmorphone PCA (1 mG/mL) Rescue Clinician Bolus 0.5 milliGRAM(s) IV Push every 15 minutes PRN  ondansetron Injectable 4 milliGRAM(s) IV Push every 6 hours PRN    Anticoagulation:  enoxaparin Injectable 40 milliGRAM(s) SubCutaneous daily    OTHER:  naloxone Injectable 0.1 milliGRAM(s) IV Push every 3 minutes PRN  senna 2 Tablet(s) Oral at bedtime    IVF:  multivitamin 1 Tablet(s) Oral daily  sodium chloride 0.9% with potassium chloride 20 mEq/L 1000 milliLiter(s) IV Continuous <Continuous>    CULTURES:    RADIOLOGY & ADDITIONAL TESTS:

## 2021-03-31 NOTE — CHART NOTE - NSCHARTNOTEFT_GEN_A_CORE
CAPRINI SCORE [CLOT] Score on Admission for     AGE RELATED RISK FACTORS                                                       MOBILITY RELATED FACTORS  [x ] Age 41-60 years                                            (1 Point)                  [ ] Bed rest                                                        (1 Point)  [ ] Age: 61-74 years                                           (2 Points)                 [ ] Plaster cast                                                   (2 Points)  [ ] Age= 75 years                                              (3 Points)                 [ ] Bed bound for more than 72 hours                 (2 Points)    DISEASE RELATED RISK FACTORS                                               GENDER SPECIFIC FACTORS  [ ] Edema in the lower extremities                       (1 Point)                  [ ] Pregnancy                                                     (1 Point)  [ ] Varicose veins                                               (1 Point)                  [ ] Post-partum < 6 weeks                                   (1 Point)             [ ] BMI > 25 Kg/m2                                            (1 Point)                  [ ] Hormonal therapy  or oral contraception          (1 Point)                 [ ] Sepsis (in the previous month)                        (1 Point)                  [ ] History of pregnancy complications                 (1 point)  [ ] Pneumonia or serious lung disease                                               [ ] Unexplained or recurrent                     (1 Point)           (in the previous month)                               (1 Point)  [ ] Abnormal pulmonary function test                     (1 Point)                 SURGERY RELATED RISK FACTORS (include planned surgeries)  [ ] Acute myocardial infarction                              (1 Point)                 [ ]  Section                                             (1 Point)  [ ] Congestive heart failure (in the previous month)  (1 Point)         [ ] Minor surgery                                                  (1 Point)   [ ] Inflammatory bowel disease                             (1 Point)                 [ ] Arthroscopic surgery                                        (2 Points)  [ ] Central venous access                                      (2 Points)                [x ] General surgery lasting more than 45 minutes   (2 Points)       [ ] Stroke (in the previous month)                          (5 Points)               [ ] Elective arthroplasty                                         (5 Points)            [ ] current or past malignancy                              (2 Points)                                                                                                       HEMATOLOGY RELATED FACTORS                                                 TRAUMA RELATED RISK FACTORS  [ ] Prior episodes of VTE                                     (3 Points)                [ ] Fracture of the hip, pelvis, or leg                       (5 Points)  [ ] Positive family history for VTE                         (3 Points)                 [ ] Acute spinal cord injury (in the previous month)  (5 Points)  [ ] Prothrombin 81198 A                                     (3 Points)                 [ ] Paralysis  (less than 1 month)                             (5 Points)  [ ] Factor V Leiden                                             (3 Points)                  [ ] Multiple Trauma within 1 month                        (5 Points)  [ ] Lupus anticoagulants                                     (3 Points)                                                           [ ] Anticardiolipin antibodies                               (3 Points)                                                       [ ] High homocysteine in the blood                      (3 Points)                                             [ ] Other congenital or acquired thrombophilia      (3 Points)                                                [ ] Heparin induced thrombocytopenia                  (3 Points)                                          Total Score [   3       ]    Risk:  Very low 0   Low 1 to 2   Moderate 3 to 4   High =5       VTE Prophylasix Recommednations:  [ x] mechanical pneumatic compression devices                                      [ ] contraindicated: _____________________  [ x] chemo prophylasix                                                                                   [ ] contraindicated _____________________    **** HIGH LIKELIHOOD DVT PRESENT ON ADMISSION  [ ] (please order LE dopplers within 24 hours of admission)

## 2021-03-31 NOTE — PROGRESS NOTE ADULT - SUBJECTIVE AND OBJECTIVE BOX
Day 1 of Anesthesia Pain Management Service    SUBJECTIVE: I'm doing ok    Pain Scale Score:	[X] Refer to charted pain scores    THERAPY:    [ ] IV PCA Morphine		[ ] 5 mg/mL	[ ] 1 mg/mL  [X] IV PCA Hydromorphone	[ ] 5 mg/mL	[X] 1 mg/mL  [ ] IV PCA Fentanyl		[ ] 50 micrograms/mL    Demand dose: 0.2 mg     Lockout: 6 minutes   Continuous Rate: 0 mg/hr  4 Hour Limit: 4 mg    MEDICATIONS  (STANDING):  ceFAZolin   IVPB 2000 milliGRAM(s) IV Intermittent once  ceFAZolin   IVPB 2000 milliGRAM(s) IV Intermittent every 8 hours  enoxaparin Injectable 40 milliGRAM(s) SubCutaneous daily  HYDROmorphone PCA (1 mG/mL) 30 milliLiter(s) PCA Continuous PCA Continuous  multivitamin 1 Tablet(s) Oral daily  senna 2 Tablet(s) Oral at bedtime  sodium chloride 0.9% with potassium chloride 20 mEq/L 1000 milliLiter(s) (75 mL/Hr) IV Continuous <Continuous>    MEDICATIONS  (PRN):  acetaminophen   Tablet .. 650 milliGRAM(s) Oral every 6 hours PRN Temp greater or equal to 38C (100.4F), Mild Pain (1 - 3)  diazepam    Tablet 5 milliGRAM(s) Oral every 6 hours PRN spasm  HYDROmorphone PCA (1 mG/mL) Rescue Clinician Bolus 0.5 milliGRAM(s) IV Push every 15 minutes PRN for Pain Scale GREATER THAN 6  naloxone Injectable 0.1 milliGRAM(s) IV Push every 3 minutes PRN For ANY of the following changes in patient status:  A. RR LESS THAN 10 breaths per minute, B. Oxygen saturation LESS THAN 90%, C. Sedation score of 6  ondansetron Injectable 4 milliGRAM(s) IV Push every 6 hours PRN Nausea      OBJECTIVE:    Sedation Score:	[ X] Alert 	[ ] Drowsy 	[ ] Arousable	[ ] Asleep	[ ] Unresponsive    Side Effects:	[X ] None	[ ] Nausea	[ ] Vomiting	[ ] Pruritus  		[ ] Other:    Vital Signs Last 24 Hrs  T(C): 36.8 (31 Mar 2021 04:31), Max: 36.9 (30 Mar 2021 13:55)  T(F): 98.2 (31 Mar 2021 04:31), Max: 98.4 (30 Mar 2021 13:55)  HR: 87 (31 Mar 2021 04:31) (63 - 87)  BP: 103/53 (31 Mar 2021 04:31) (94/59 - 150/75)  BP(mean): 97 (30 Mar 2021 16:15) (82 - 97)  RR: 16 (31 Mar 2021 04:31) (16 - 18)  SpO2: 99% (31 Mar 2021 05:00) (96% - 100%)    ASSESSMENT/ PLAN    Therapy to  be:               [X] Continued   [ ] Discontinued   [ ] Changed to PRN Analgesics    Documentation and Verification of current medications:   [X] Done	[ ] Not done, not eligible    Comments: PCA use minimal 0-1x/hr (3mg since PCA initiation) Recommend antispasmodic prn. Consider transition to prn analgesics

## 2021-04-01 PROCEDURE — 72082 X-RAY EXAM ENTIRE SPI 2/3 VW: CPT | Mod: 26

## 2021-04-01 RX ORDER — ACETAMINOPHEN 500 MG
1000 TABLET ORAL ONCE
Refills: 0 | Status: COMPLETED | OUTPATIENT
Start: 2021-04-01 | End: 2021-04-03

## 2021-04-01 RX ADMIN — OXYCODONE HYDROCHLORIDE 10 MILLIGRAM(S): 5 TABLET ORAL at 07:30

## 2021-04-01 RX ADMIN — OXYCODONE HYDROCHLORIDE 10 MILLIGRAM(S): 5 TABLET ORAL at 14:01

## 2021-04-01 RX ADMIN — OXYCODONE HYDROCHLORIDE 10 MILLIGRAM(S): 5 TABLET ORAL at 14:32

## 2021-04-01 RX ADMIN — OXYCODONE HYDROCHLORIDE 10 MILLIGRAM(S): 5 TABLET ORAL at 21:20

## 2021-04-01 RX ADMIN — OXYCODONE HYDROCHLORIDE 10 MILLIGRAM(S): 5 TABLET ORAL at 06:53

## 2021-04-01 RX ADMIN — ENOXAPARIN SODIUM 40 MILLIGRAM(S): 100 INJECTION SUBCUTANEOUS at 11:43

## 2021-04-01 RX ADMIN — OXYCODONE HYDROCHLORIDE 10 MILLIGRAM(S): 5 TABLET ORAL at 20:49

## 2021-04-01 RX ADMIN — Medication 650 MILLIGRAM(S): at 07:30

## 2021-04-01 NOTE — PROGRESS NOTE ADULT - SUBJECTIVE AND OBJECTIVE BOX
Post-op day #2 s/p L4-S1 PLIF    OVERNIGHT EVENTS: no acute event       Vital Signs Last 24 Hrs  T(C): 37.4 (01 Apr 2021 07:56), Max: 37.4 (01 Apr 2021 07:56)  T(F): 99.4 (01 Apr 2021 07:56), Max: 99.4 (01 Apr 2021 07:56)  HR: 80 (01 Apr 2021 07:56) (75 - 95)  BP: 96/62 (01 Apr 2021 07:56) (94/60 - 120/67)  BP(mean): --  RR: 18 (01 Apr 2021 07:56) (18 - 18)  SpO2: 95% (01 Apr 2021 07:56) (94% - 98%)    I&O's Detail    31 Mar 2021 07:01  -  01 Apr 2021 07:00  --------------------------------------------------------  IN:    IV PiggyBack: 50 mL    Oral Fluid: 1740 mL    sodium chloride 0.9% w/ Additives: 1650 mL  Total IN: 3440 mL    OUT:    Accordian (mL): 105 mL    Indwelling Catheter - Urethral (mL): 850 mL    Voided (mL): 300 mL  Total OUT: 1255 mL    Total NET: 2185 mL        I&O's Summary    31 Mar 2021 07:01  -  01 Apr 2021 07:00  --------------------------------------------------------  IN: 3440 mL / OUT: 1255 mL / NET: 2185 mL        PHYSICAL EXAM:  Neurological:    Motor exam:         [x] Upper extremity                 D             B          T          WE       WF      HI                                               R         5/5        5/5        5/5       5/5     5/5       5/5                                               L          5/5        5/5        5/5       5/5     5/5       5/5         [x] Lower extremity                Ps          Ha        Quad    EHL        FHL                                               R        5/5        5/5        5/5       5/5         5/5                                               L         5/5        5/5       5/5       5/5          5/5                   Sensation:  [x] intact to light touch  [] decreased:       Gait    Cardiovascular: Regular  Respiratory: Clear bilaterally  Gastrointestinal: Abd soft + BS non tender  Genitourinary:  Extremities: -C/C/E  Incision/Wound: Intact, HMV left in place    TUBES/LINES:  [] CVC  [] A-line  [] Lumbar Drain  [] Ventriculostomy  [] Other    DIET:  [] NPO  [] Mechanical  [] Tube feeds    LABS:                        9.4    13.97 )-----------( 252      ( 01 Apr 2021 06:47 )             30.5     04-01    138  |  103  |  14  ----------------------------<  112<H>  4.4   |  27  |  0.79    Ca    8.6      01 Apr 2021 06:48        CAPILLARY BLOOD GLUCOSE      Drug Levels: [] N/A    CSF Analysis: [] N/A      Allergies    No Known Allergies    Intolerances      MEDICATIONS:  Antibiotics:  ceFAZolin   IVPB 2000 milliGRAM(s) IV Intermittent once    Neuro:  acetaminophen   Tablet .. 650 milliGRAM(s) Oral every 6 hours PRN  acetaminophen  IVPB .. 1000 milliGRAM(s) IV Intermittent once PRN  diazepam    Tablet 5 milliGRAM(s) Oral every 6 hours PRN  HYDROmorphone  Injectable 0.5 milliGRAM(s) IV Push every 3 hours PRN  ondansetron Injectable 4 milliGRAM(s) IV Push every 6 hours PRN  oxyCODONE    Solution 5 milliGRAM(s) Oral every 4 hours PRN  oxyCODONE    Solution 10 milliGRAM(s) Oral every 4 hours PRN    Anticoagulation:  enoxaparin Injectable 40 milliGRAM(s) SubCutaneous daily    OTHER:  polyethylene glycol 3350 17 Gram(s) Oral two times a day  senna 2 Tablet(s) Oral at bedtime    IVF:  multivitamin 1 Tablet(s) Oral daily    CULTURES:    RADIOLOGY & ADDITIONAL TESTS:

## 2021-04-01 NOTE — PROGRESS NOTE ADULT - ASSESSMENT
49 y/o HDL and cholecystectomy () and  28 years ago. Endorsed low back pain and on and off LLE pain for a few years but gotten worse since 2021.  Saw PCP and referred to Dr. Lundberg, MRI + for L2-S1 spondylolisthesis with central foramina stenosis. Pt reports 2/10 pain today. Denies fecal and urinary incontinence.    Denies Covid 19 infection, sick contacts or recent travel.   Echo and stress test  as part of regular physical exam    50y Female s/p L4-S1 PLIF ON 3/30/21    PLAN:  NEURO:  Doing well on post-op day #2   Oxycodone PRN for pain  Valium for muscles spasm  Pt saw, disposition  is pending    CARDIOVASCULAR:  Hemodynamically stable    PULMONARY:  Keep O2 sat > 94%  Continue incentive spirometer    RENAL:  IVL  Voiding       GI:  Tolerating regular diet  Bowel regimen; Senna, will add Miralax    HEME:  H/H stable    ID: Afebrile    ENDO: no issue    DVT PROPHYLAXIS:  [x] Venodynes                                [x] Heparin/Lovenox    FALL RISK:  [x] Low Risk                                    [] Impulsive

## 2021-04-02 ENCOUNTER — TRANSCRIPTION ENCOUNTER (OUTPATIENT)
Age: 51
End: 2021-04-02

## 2021-04-02 DIAGNOSIS — M43.16 SPONDYLOLISTHESIS, LUMBAR REGION: ICD-10-CM

## 2021-04-02 DIAGNOSIS — E78.5 HYPERLIPIDEMIA, UNSPECIFIED: ICD-10-CM

## 2021-04-02 DIAGNOSIS — Z29.9 ENCOUNTER FOR PROPHYLACTIC MEASURES, UNSPECIFIED: ICD-10-CM

## 2021-04-02 DIAGNOSIS — K91.89 OTHER POSTPROCEDURAL COMPLICATIONS AND DISORDERS OF DIGESTIVE SYSTEM: ICD-10-CM

## 2021-04-02 LAB
ANION GAP SERPL CALC-SCNC: 9 MMOL/L — SIGNIFICANT CHANGE UP (ref 5–17)
BASOPHILS # BLD AUTO: 0.03 K/UL — SIGNIFICANT CHANGE UP (ref 0–0.2)
BASOPHILS NFR BLD AUTO: 0.3 % — SIGNIFICANT CHANGE UP (ref 0–2)
BUN SERPL-MCNC: 11 MG/DL — SIGNIFICANT CHANGE UP (ref 7–23)
CALCIUM SERPL-MCNC: 8.9 MG/DL — SIGNIFICANT CHANGE UP (ref 8.4–10.5)
CHLORIDE SERPL-SCNC: 101 MMOL/L — SIGNIFICANT CHANGE UP (ref 96–108)
CO2 SERPL-SCNC: 26 MMOL/L — SIGNIFICANT CHANGE UP (ref 22–31)
CREAT SERPL-MCNC: 0.64 MG/DL — SIGNIFICANT CHANGE UP (ref 0.5–1.3)
EOSINOPHIL # BLD AUTO: 0.01 K/UL — SIGNIFICANT CHANGE UP (ref 0–0.5)
EOSINOPHIL NFR BLD AUTO: 0.1 % — SIGNIFICANT CHANGE UP (ref 0–6)
GLUCOSE SERPL-MCNC: 112 MG/DL — HIGH (ref 70–99)
HCT VFR BLD CALC: 29.7 % — LOW (ref 34.5–45)
HGB BLD-MCNC: 9.4 G/DL — LOW (ref 11.5–15.5)
IMM GRANULOCYTES NFR BLD AUTO: 0.5 % — SIGNIFICANT CHANGE UP (ref 0–1.5)
LMWH PPP CHRO-ACNC: 0.17 IU/ML — LOW (ref 0.5–1.1)
LYMPHOCYTES # BLD AUTO: 2.63 K/UL — SIGNIFICANT CHANGE UP (ref 1–3.3)
LYMPHOCYTES # BLD AUTO: 22.2 % — SIGNIFICANT CHANGE UP (ref 13–44)
MCHC RBC-ENTMCNC: 29.8 PG — SIGNIFICANT CHANGE UP (ref 27–34)
MCHC RBC-ENTMCNC: 31.6 GM/DL — LOW (ref 32–36)
MCV RBC AUTO: 94.3 FL — SIGNIFICANT CHANGE UP (ref 80–100)
MONOCYTES # BLD AUTO: 0.92 K/UL — HIGH (ref 0–0.9)
MONOCYTES NFR BLD AUTO: 7.8 % — SIGNIFICANT CHANGE UP (ref 2–14)
NEUTROPHILS # BLD AUTO: 8.2 K/UL — HIGH (ref 1.8–7.4)
NEUTROPHILS NFR BLD AUTO: 69.1 % — SIGNIFICANT CHANGE UP (ref 43–77)
NRBC # BLD: 0 /100 WBCS — SIGNIFICANT CHANGE UP (ref 0–0)
PLATELET # BLD AUTO: 250 K/UL — SIGNIFICANT CHANGE UP (ref 150–400)
POTASSIUM SERPL-MCNC: 4 MMOL/L — SIGNIFICANT CHANGE UP (ref 3.5–5.3)
POTASSIUM SERPL-SCNC: 4 MMOL/L — SIGNIFICANT CHANGE UP (ref 3.5–5.3)
RBC # BLD: 3.15 M/UL — LOW (ref 3.8–5.2)
RBC # FLD: 13.7 % — SIGNIFICANT CHANGE UP (ref 10.3–14.5)
SODIUM SERPL-SCNC: 136 MMOL/L — SIGNIFICANT CHANGE UP (ref 135–145)
WBC # BLD: 11.85 K/UL — HIGH (ref 3.8–10.5)
WBC # FLD AUTO: 11.85 K/UL — HIGH (ref 3.8–10.5)

## 2021-04-02 PROCEDURE — 99223 1ST HOSP IP/OBS HIGH 75: CPT

## 2021-04-02 PROCEDURE — 74018 RADEX ABDOMEN 1 VIEW: CPT | Mod: 26

## 2021-04-02 RX ORDER — ACETAMINOPHEN 500 MG
2 TABLET ORAL
Qty: 0 | Refills: 0 | DISCHARGE

## 2021-04-02 RX ORDER — MULTIVIT WITH MIN/MFOLATE/K2 340-15/3 G
1 POWDER (GRAM) ORAL ONCE
Refills: 0 | Status: COMPLETED | OUTPATIENT
Start: 2021-04-02 | End: 2021-04-02

## 2021-04-02 RX ORDER — OXYCODONE HYDROCHLORIDE 5 MG/1
5 TABLET ORAL
Qty: 210 | Refills: 0
Start: 2021-04-02 | End: 2021-04-08

## 2021-04-02 RX ORDER — DEXTROSE MONOHYDRATE, SODIUM CHLORIDE, AND POTASSIUM CHLORIDE 50; .745; 4.5 G/1000ML; G/1000ML; G/1000ML
1000 INJECTION, SOLUTION INTRAVENOUS
Refills: 0 | Status: DISCONTINUED | OUTPATIENT
Start: 2021-04-02 | End: 2021-04-03

## 2021-04-02 RX ORDER — ACETAMINOPHEN 500 MG
20.31 TABLET ORAL
Qty: 0 | Refills: 0 | DISCHARGE
Start: 2021-04-02

## 2021-04-02 RX ORDER — SENNA PLUS 8.6 MG/1
2 TABLET ORAL
Qty: 0 | Refills: 0 | DISCHARGE
Start: 2021-04-02

## 2021-04-02 RX ORDER — ACETAMINOPHEN 500 MG
1000 TABLET ORAL ONCE
Refills: 0 | Status: COMPLETED | OUTPATIENT
Start: 2021-04-02 | End: 2021-04-03

## 2021-04-02 RX ORDER — POLYETHYLENE GLYCOL 3350 17 G/17G
17 POWDER, FOR SOLUTION ORAL
Qty: 0 | Refills: 0 | DISCHARGE
Start: 2021-04-02

## 2021-04-02 RX ORDER — ACETAMINOPHEN 500 MG
650 TABLET ORAL EVERY 6 HOURS
Refills: 0 | Status: DISCONTINUED | OUTPATIENT
Start: 2021-04-02 | End: 2021-04-04

## 2021-04-02 RX ADMIN — POLYETHYLENE GLYCOL 3350 17 GRAM(S): 17 POWDER, FOR SOLUTION ORAL at 05:25

## 2021-04-02 RX ADMIN — OXYCODONE HYDROCHLORIDE 10 MILLIGRAM(S): 5 TABLET ORAL at 05:56

## 2021-04-02 RX ADMIN — Medication 1 BOTTLE: at 14:13

## 2021-04-02 RX ADMIN — ONDANSETRON 4 MILLIGRAM(S): 8 TABLET, FILM COATED ORAL at 11:35

## 2021-04-02 RX ADMIN — ENOXAPARIN SODIUM 40 MILLIGRAM(S): 100 INJECTION SUBCUTANEOUS at 11:35

## 2021-04-02 RX ADMIN — OXYCODONE HYDROCHLORIDE 10 MILLIGRAM(S): 5 TABLET ORAL at 05:25

## 2021-04-02 NOTE — DISCHARGE NOTE PROVIDER - CARE PROVIDERS DIRECT ADDRESSES
[email or web address]

## 2021-04-02 NOTE — DISCHARGE NOTE PROVIDER - NSDCACTIVITY_GEN_ALL_CORE
Do not drive or operate machinery/Showering allowed/Stairs allowed/Walking - Indoors allowed/No heavy lifting/straining/Walking - Outdoors allowed Bathing allowed/Do not drive or operate machinery/Showering allowed/Stairs allowed/Walking - Indoors allowed/No heavy lifting/straining/Walking - Outdoors allowed

## 2021-04-02 NOTE — CONSULT NOTE ADULT - ASSESSMENT
Impression:     Impression:    #Post-operative ileus - Distended loops of small bowel on abdominal x-ray (no official read) with nausea, emesis and mild abdominal distention, with passage of flatus. Ileus exacerbated by opiate analgesics and relative immobility in post-operative course. No electrolyte derangements or prior bowel surgery concerning for mechanical obstruction vs. adhesion.   #L2-S1 spondylolisthesis s/p posterior fusion  #Right hand numbness   #Tobacco use    Recommendations:  - NPO for now  - NGT for gastric decompression - benefits explained in detail to patient who is currently declining placement  - stop oxycodone PRN and avoid other opiate medications for pain control  - acetaminophen IV PRN to max dose 4g daily for pain control   - can give FLEET enema for constipation in lieu of Miralax for now  - monitor electrolyte daily and correct for any deranagements  - encourage ambulation   - GI to follow      Racheal Marr PGY-5  Gastroenterology/Hepatology Fellow  Pager #[phone]  E-mail [email or web address]  Call [phone] to speak to answering service for on-call GI fellow 5pm-7am and weekends.

## 2021-04-02 NOTE — DISCHARGE NOTE PROVIDER - NSDCFUADDAPPT_GEN_ALL_CORE_FT
Please call Dr Lundberg office for follow up appointment within 10 days for post-op care  Please call Dr Lundberg office for follow up appointment for post-op care  Please call your Neurosurgeon for an appointment within 1 week after your hospital discharge for wound check and further recommendations.    Followup with your Scott MD in 1 week

## 2021-04-02 NOTE — CONSULT NOTE ADULT - SUBJECTIVE AND OBJECTIVE BOX
Chief Complaint:  Patient is a 50y old  Female who presents with a chief complaint of LLE radiculopathy and Low back pain (2021 13:33)      HPI:    50 year old woman with h/o cholecystectomy () with chronic lower back pain secondary to L2-S1 spondylolisthesis with spinal stenosis admitted on 3/30/21 for L5-S1 posterior lumbar fusion with post-operative course c/b new nausea and vomiting post-operative day 3. Patient report wretching and bilious vomiting this morning only temporarily improved with Zofran.  She has eaten little solid food due to decreased appetite but noted worsening abdominal distention since hospitalization. She denies bowel movements but has been passing flatus for past 2 days. Abdominal x-ray today demonstrated dilated loops of small bowel concerning for ileus. At baseline, patient has soft bowel movements daily and denies chronic/recurrent constipation. She has never had a colonoscopy. In addition to cholecystectomy, she has had a  section. She has being using oxycodone occasionally for pain - last dose received at 5 am this morning.        Allergies:  No Known Allergies      Home Medications:    · 	Tylenol 500 mg oral tablet: Last Dose Taken:  , 2 tab(s) orally every 6 hours, As Needed    Hospital Medications:  acetaminophen    Suspension .. 650 milliGRAM(s) Oral every 6 hours PRN  acetaminophen  IVPB .. 1000 milliGRAM(s) IV Intermittent once  acetaminophen  IVPB .. 1000 milliGRAM(s) IV Intermittent once PRN  enoxaparin Injectable 40 milliGRAM(s) SubCutaneous daily  multivitamin 1 Tablet(s) Oral daily  ondansetron Injectable 4 milliGRAM(s) IV Push every 6 hours PRN  saline laxative (FLEET) Rectal Enema 1 Enema Rectal once  senna 2 Tablet(s) Oral at bedtime  sodium chloride 0.9% with potassium chloride 20 mEq/L 1000 milliLiter(s) IV Continuous <Continuous>      PMHX/PSHX:  Hyperlipemia    S/P cholecystectomy    S/P         Family history:      Social History:     ROS:     General:  No weight loss, fevers, chills, night sweats, fatigue  Eyes:  No vision changes, no yellowing of eyes   ENT:  No throat pain, runny nose  CV:  No chest pain, palpitations  Resp:  No SOB, cough, wheezing  GI:  See HPI  :  No burning with urination, no hematuria   Muscle:  No muscle pain, weakness  Neuro:  No memory problems +right hand numbness/tingling  Psych:  No fatigue, insomnia, mood problems  Heme:  No easy bruisability  Skin:  No rash, itching       PHYSICAL EXAM:     GENERAL:  Sitting up to chair, well-nourished, no acute distress  HEENT:  Conjunctivae clear and pink,  no JVD  CHEST:  No increased respiratory effort  HEART:  Regular rhythm & rate   ABDOMEN:  Non-tender +softly distended +bowel sound present  EXTREMITIES:  no LE edema  SKIN:  No rash/erythema/ecchymoses/petechiae/jaundice   NEURO:  Alert, orientedx3    Vital Signs:  Vital Signs Last 24 Hrs  T(C): 36.7 (2021 16:03), Max: 37.4 (2021 20:43)  T(F): 98 (2021 16:03), Max: 99.4 (2021 20:43)  HR: 83 (2021 16:03) (83 - 90)  BP: 140/72 (2021 16:03) (103/62 - 140/72)  BP(mean): --  RR: 18 (2021 16:03) (18 - 18)  SpO2: 96% (2021 16:03) (94% - 97%)   Daily       LABS:                        9.4    11.85 )-----------( 250      ( 2021 06:55 )             29.7     04-02    136  |  101  |  11  ----------------------------<  112<H>  4.0   |  26  |  0.64    Ca    8.9      2021 06:57      Imaging:      Abdominal x-ray from 21 reviewed.       Chief Complaint:  Patient is a 50y old  Female who presents with a chief complaint of LLE radiculopathy and Low back pain (2021 13:33)      HPI:    50 year old woman with h/o cholecystectomy (), tobacco use, chronic lower back pain due to L2-S1 spondylolisthesis with spinal stenosis admitted on 3/30/21 for L5-S1 posterior lumbar fusion c/b new nausea and vomiting post-operative day 3. Patient report wretching and bilious vomiting this morning, since only temporarily improved with Zofran.  She has eaten little solid food due to decreased appetite but notes worsening abdominal distention over past 2 days. No bowel movements but has been passing flatus since yesterday. Abdominal x-ray today demonstrated dilated loops of small bowel concerning for ileus. At baseline, patient has soft stools daily and denies chronic/recurrent constipation. No prior colonoscopy. She has being using oxycodone occasionally for pain - last dose received at 5 am this morning.        Allergies:  No Known Allergies      Home Medications:    · 	Tylenol 500 mg oral tablet: Last Dose Taken:  , 2 tab(s) orally every 6 hours, As Needed    Hospital Medications:  acetaminophen    Suspension .. 650 milliGRAM(s) Oral every 6 hours PRN  acetaminophen  IVPB .. 1000 milliGRAM(s) IV Intermittent once  acetaminophen  IVPB .. 1000 milliGRAM(s) IV Intermittent once PRN  enoxaparin Injectable 40 milliGRAM(s) SubCutaneous daily  multivitamin 1 Tablet(s) Oral daily  ondansetron Injectable 4 milliGRAM(s) IV Push every 6 hours PRN  saline laxative (FLEET) Rectal Enema 1 Enema Rectal once  senna 2 Tablet(s) Oral at bedtime  sodium chloride 0.9% with potassium chloride 20 mEq/L 1000 milliLiter(s) IV Continuous <Continuous>      PMHX/PSHX:  Hyperlipemia    S/P cholecystectomy    S/P         Family history:      Social History:     ROS:     General:  No weight loss, fevers, chills, night sweats, fatigue  Eyes:  No vision changes, no yellowing of eyes   ENT:  No throat pain, runny nose  CV:  No chest pain, palpitations  Resp:  No SOB, cough, wheezing  GI:  See HPI  :  No burning with urination, no hematuria   Muscle:  No muscle pain, weakness  Neuro:  No memory problems +right hand numbness/tingling  Psych:  No fatigue, insomnia, mood problems  Heme:  No easy bruisability  Skin:  No rash, itching       PHYSICAL EXAM:     GENERAL:  Sitting up to chair, well-nourished, no acute distress  HEENT:  Conjunctivae clear and pink,  no JVD  CHEST:  No increased respiratory effort  HEART:  Regular rhythm & rate   ABDOMEN:  Non-tender +softly distended +bowel sound present  EXTREMITIES:  no LE edema  SKIN:  No rash/erythema/ecchymoses/petechiae/jaundice   NEURO:  Alert, orientedx3    Vital Signs:  Vital Signs Last 24 Hrs  T(C): 36.7 (2021 16:03), Max: 37.4 (2021 20:43)  T(F): 98 (2021 16:03), Max: 99.4 (2021 20:43)  HR: 83 (2021 16:03) (83 - 90)  BP: 140/72 (2021 16:03) (103/62 - 140/72)  BP(mean): --  RR: 18 (2021 16:03) (18 - 18)  SpO2: 96% (2021 16:03) (94% - 97%)   Daily       LABS:                        9.4    11.85 )-----------( 250      ( 2021 06:55 )             29.7     04-02    136  |  101  |  11  ----------------------------<  112<H>  4.0   |  26  |  0.64    Ca    8.9      2021 06:57      Imaging:      Abdominal x-ray from 21 reviewed.

## 2021-04-02 NOTE — CONSULT NOTE ADULT - PROBLEM SELECTOR RECOMMENDATION 4
DVT ppx: lovenox  home medications reviewed. patient not on medications at home with exception of tylenol PRN.    Plan discussed with patient, GI fellow Racheal, and neurosurgery MIAH Forrester

## 2021-04-02 NOTE — PROGRESS NOTE ADULT - ASSESSMENT
51 y/o HDL and cholecystectomy () and  28 years ago. Endorsed low back pain and on and off LLE pain for a few years but gotten worse since 2021.  Saw PCP and referred to Dr. Lundberg, MRI + for L2-S1 spondylolisthesis with central foramina stenosis. Pt reports 2/10 pain today. Denies fecal and urinary incontinence.    Denies Covid 19 infection, sick contacts or recent travel.   Echo and stress test  as part of regular physical exam    50y Female s/p L4-S1 PLIF ON 3/30/21    PLAN:  NEURO:  Doing well on post-op day #3 but nauseous needs to have a BM  Patient was encourage to ambulate around  Oxycodone PRN for pain  Valium for muscles spasm  Home with out patient PT once medically cleared    CARDIOVASCULAR:  Hemodynamically stable    PULMONARY:  Keep O2 sat > 94%  Continue incentive spirometer    RENAL:  IVL  Voiding       GI:  Tolerating regular diet  Bowel regimen; Senna ,Miralax, Magnesium citrate added    HEME:  H/H stable    ID: Afebrile    ENDO: no issue    DVT PROPHYLAXIS:  [x] Venodynes                                [x] Heparin/Lovenox    FALL RISK:  [x] Low Risk                                    [] Impulsive

## 2021-04-02 NOTE — CONSULT NOTE ADULT - ASSESSMENT
51 yo female, active smoker (smokes 1/2 PPD), HLD, s/p cholecystectomy in  and  in  who presented with worsening low back pain now s/p L4-S1 PLIF on 3/30/21 with post-op complicated by ileus.

## 2021-04-02 NOTE — CONSULT NOTE ADULT - ATTENDING COMMENTS
50F with obesity, h/o cholecystectomy, chronic back pain now s/p lumbar fusion.  GI consulted for post-op ileus with nausea and vomiting. Today, patient reports having bowel movements prior to recommended enemas and in general, she is feeling much better. Recommend advancing her diet, continuing to hold/limit narcotics, frequent ambulation and OOB to chair, and providing a daily bowel regimen of Miralax 1-2 times daily.     Thank you for this interesting consult.  Please call the advanced GI service with any questions or concerns.

## 2021-04-02 NOTE — DISCHARGE NOTE PROVIDER - CARE PROVIDER_API CALL
Oswaldo Lundberg (MD)  Neurosurgery  [address] [place] [address], [address]  [place] [address]  Phone: [phone]  Fax: [phone]  Follow Up Time:

## 2021-04-02 NOTE — CONSULT NOTE ADULT - SUBJECTIVE AND OBJECTIVE BOX
[facility] Division of Hospital Medicine  Huyen Campos MD  Pager (M-F, 9A-3D): [phone]  Other Times:  [phone]    PCP: Dr. Clint Mondragon    HPI:  49 yo female, active smoker (smokes 1/2 PPD, HLD, s/p cholecystectomy in  and  in  who presented with worsening low back pain associated with intermittent LLE pain. Evaluated by neurosurgeon Dr. Lundberg as outpatient and found to have L2-S1 spondylolisthesis with central foramina stenosis. No numbness, tingling, nor weakness of lower extremities b/l. Patient presented for [facility] for scheduled L4-S1 PLIF on 3/30/21. Hospital course complicated by nausea and vomiting found to have ileus. Medicine consulted for ileus management.    Interval History: patient seen and examined bedside. endorses ongoing nausea, last episode of emesis earlier this morning (clear/off yellow fluid), abd distension. passing selin amounts of flatus but no BM since prior to surgery; typically has BM daily.    PAST MEDICAL & SURGICAL HISTORY:  Hyperlipemia  Not taking meds currently    S/P cholecystectomy      S/P           Review of Systems:   CONSTITUTIONAL: No fever, chills  HEENT: No sore throat, vision changes  RESPIRATORY: No cough, wheezing, shortness of breath  CARDIOVASCULAR: No chest pain, palpitations, leg edema  GASTROINTESTINAL: +nausea, +NBNB emesis, +abd distension, +constipation  GENITOURINARY: No dysuria, frequency, hematuria  NEUROLOGICAL: No headaches, memory loss, loss of strength, numbness, or tremors  SKIN: No itching, burning, rashes, or lesions   MUSCULOSKELETAL: No joint pain or swelling; No muscle, back, or extremity pain  PSYCHIATRIC: No depression, anxiety  HEME/LYMPH: No easy bruising, or bleeding gums      Allergies    No Known Allergies    Intolerances        Social History:   current smoker, smokes 1/2 PPD x 20  years  social EtOH use, denies drug use  working in [employer]    FAMILY HISTORY:  no history of colon ca in family    MEDICATIONS  (STANDING):  acetaminophen  IVPB .. 1000 milliGRAM(s) IV Intermittent once  enoxaparin Injectable 40 milliGRAM(s) SubCutaneous daily  multivitamin 1 Tablet(s) Oral daily  saline laxative (FLEET) Rectal Enema 1 Enema Rectal once  senna 2 Tablet(s) Oral at bedtime  sodium chloride 0.9% with potassium chloride 20 mEq/L 1000 milliLiter(s) (75 mL/Hr) IV Continuous <Continuous>    MEDICATIONS  (PRN):  acetaminophen    Suspension .. 650 milliGRAM(s) Oral every 6 hours PRN Temp greater or equal to 38C (100.4F), Mild Pain (1 - 3)  acetaminophen  IVPB .. 1000 milliGRAM(s) IV Intermittent once PRN Severe Pain (7 - 10)  ondansetron Injectable 4 milliGRAM(s) IV Push every 6 hours PRN Nausea        CAPILLARY BLOOD GLUCOSE        I&O's Summary    2021 07:01  -  2021 07:00  --------------------------------------------------------  IN: 1440 mL / OUT: 4 mL / NET: 1436 mL    2021 07:01  -  2021 17:51  --------------------------------------------------------  IN: 480 mL / OUT: 0 mL / NET: 480 mL        Physical Exam:  Vital Signs Last 24 Hrs  T(C): 36.7 (2021 16:03), Max: 37.4 (2021 20:43)  T(F): 98 (2021 16:03), Max: 99.4 (2021 20:43)  HR: 83 (2021 16:03) (83 - 90)  BP: 140/72 (2021 16:03) (103/62 - 140/72)  BP(mean): --  RR: 18 (2021 16:03) (18 - 18)  SpO2: 96% (2021 16:03) (94% - 97%)    CONSTITUTIONAL: NAD, well-developed, well-groomed  EYES: PERRLA; conjunctiva and sclera clear  ENMT: Moist oral mucosa, no pharyngeal injection or exudates; normal dentition  NECK: Supple, no palpable masses; no thyromegaly  RESPIRATORY: Normal respiratory effort; lungs are clear to auscultation bilaterally  CARDIOVASCULAR: Regular rate and rhythm, normal S1 and S2, no murmur/rub/gallop; No lower extremity edema; Peripheral pulses are 2+ bilaterally  ABDOMEN: Soft, Nondistended,  Nontender to palpation, normoactive bowel sounds  MUSCULOSKELETAL:  No clubbing or cyanosis of digits; no joint swelling or tenderness to palpation  PSYCH: A+O to person, place, and time; affect appropriate  NEUROLOGY: CN 2-12 are intact and symmetric; no gross sensory deficits   SKIN: No rashes; no palpable lesions    LABS:                        9.4    11.85 )-----------( 250      ( 2021 06:55 )             29.7     04-    136  |  101  |  11  ----------------------------<  112<H>  4.0   |  26  |  0.64    Ca    8.9      2021 06:57                  RADIOLOGY & ADDITIONAL TESTS:  Results Reviewed:   Imaging Personally Reviewed:  Electrocardiogram Personally Reviewed:    COORDINATION OF CARE:  Care Discussed with Consultants/Other Providers [Y/N]:  Prior or Outpatient Records Reviewed [Y/N]:   [facility] Division of Hospital Medicine  Huyen Campos MD  Pager (M-F, 2S-2K): [phone]  Other Times:  [phone]    PCP: Dr. Clint Mondragon    HPI:  49 yo female, active smoker (smokes 1/2 PPD, HLD, s/p cholecystectomy in  and  in  who presented with worsening low back pain associated with intermittent LLE pain. Evaluated by neurosurgeon Dr. Lundberg as outpatient and found to have L2-S1 spondylolisthesis with central foramina stenosis. No numbness, tingling, nor weakness of lower extremities b/l. Patient presented for [facility] for scheduled L4-S1 PLIF on 3/30/21. Hospital course complicated by nausea and vomiting found to have ileus. Medicine consulted for ileus management.    Interval History: patient seen and examined bedside. endorses ongoing nausea, last episode of emesis earlier this morning (clear/off yellow fluid), abd distension. passing selin amounts of flatus but no BM since prior to surgery; typically has BM daily.    PAST MEDICAL & SURGICAL HISTORY:  Hyperlipemia  Not taking meds currently    S/P cholecystectomy      S/P           Review of Systems:   CONSTITUTIONAL: No fever, chills  HEENT: No sore throat, vision changes  RESPIRATORY: No cough, wheezing, shortness of breath  CARDIOVASCULAR: No chest pain, palpitations, leg edema  GASTROINTESTINAL: +nausea, +NBNB emesis, +abd distension, +constipation  GENITOURINARY: No dysuria, frequency, hematuria  NEUROLOGICAL: No headaches, memory loss, loss of strength, numbness, or tremors  SKIN: No itching, burning, rashes, or lesions   MUSCULOSKELETAL: No joint pain or swelling; No muscle, back, or extremity pain  PSYCHIATRIC: No depression, anxiety  HEME/LYMPH: No easy bruising, or bleeding gums      Allergies    No Known Allergies    Intolerances        Social History:   current smoker, smokes 1/2 PPD x 20  years  social EtOH use, denies drug use  working in [employer]    FAMILY HISTORY:  no history of colon ca in family    MEDICATIONS  (STANDING):  acetaminophen  IVPB .. 1000 milliGRAM(s) IV Intermittent once  enoxaparin Injectable 40 milliGRAM(s) SubCutaneous daily  multivitamin 1 Tablet(s) Oral daily  saline laxative (FLEET) Rectal Enema 1 Enema Rectal once  senna 2 Tablet(s) Oral at bedtime  sodium chloride 0.9% with potassium chloride 20 mEq/L 1000 milliLiter(s) (75 mL/Hr) IV Continuous <Continuous>    MEDICATIONS  (PRN):  acetaminophen    Suspension .. 650 milliGRAM(s) Oral every 6 hours PRN Temp greater or equal to 38C (100.4F), Mild Pain (1 - 3)  acetaminophen  IVPB .. 1000 milliGRAM(s) IV Intermittent once PRN Severe Pain (7 - 10)  ondansetron Injectable 4 milliGRAM(s) IV Push every 6 hours PRN Nausea        CAPILLARY BLOOD GLUCOSE        I&O's Summary    2021 07:01  -  2021 07:00  --------------------------------------------------------  IN: 1440 mL / OUT: 4 mL / NET: 1436 mL    2021 07:01  -  2021 17:51  --------------------------------------------------------  IN: 480 mL / OUT: 0 mL / NET: 480 mL        Physical Exam:  Vital Signs Last 24 Hrs  T(C): 36.7 (2021 16:03), Max: 37.4 (2021 20:43)  T(F): 98 (2021 16:03), Max: 99.4 (2021 20:43)  HR: 83 (2021 16:03) (83 - 90)  BP: 140/72 (2021 16:03) (103/62 - 140/72)  BP(mean): --  RR: 18 (2021 16:03) (18 - 18)  SpO2: 96% (2021 16:03) (94% - 97%)    CONSTITUTIONAL: NAD, well-developed, well-groomed  EYES: PERRLA; conjunctiva and sclera clear  ENMT: Moist oral mucosa, no pharyngeal injection or exudates; normal dentition  NECK: Supple, no palpable masses; no thyromegaly  RESPIRATORY: Normal respiratory effort; lungs are clear to auscultation bilaterally  CARDIOVASCULAR: Regular rate and rhythm, normal S1 and S2, no murmur/rub/gallop; No lower extremity edema; Peripheral pulses are 2+ bilaterally  ABDOMEN: Soft, +mildly distended, +tender to deep palpation, +normoactive bowel sounds  MUSCULOSKELETAL:  No clubbing or cyanosis of digits; no joint swelling or tenderness to palpation  PSYCH: A+O to person, place, and time; affect appropriate  NEUROLOGY: CN 2-12 are intact and symmetric; no gross sensory deficits with exception of L 5th phalanx numbness, 5/5 strength in upper and lower extremities throughout  SKIN: +surgical site c/d/i, mild ooze on overlying gauze    LABS:                        9.4    11.85 )-----------( 250      ( 2021 06:55 )             29.7     04-02    136  |  101  |  11  ----------------------------<  112<H>  4.0   |  26  |  0.64    Ca    8.9      2021 06:57        RADIOLOGY & ADDITIONAL TESTS:  Results Reviewed: H/H stable, no leukocytosis, electrolytes at goal, Cr within normal limits  Imaging Personally Reviewed:  3/31/21 Duplex of lower extremities: negative for acute DVT bilaterally  21 XR thoracolumbar spine:  IMPRESSION:  L4-S1 level posterior spinal fusion hardware currently place consisting of pedicle screws with interconnecting rods and 2 metallic L5-S1 level interbody disc spacer implants. Laminectomy defects also noted.  Vertical configuration surgical skin staples also overlie lower back incision site.  No compression fractures.  Hardware stabilized L5 on S1 anterolisthesis and L4-L5 retrolisthesis. Remaining vertebral body alignment maintained.  Stable remaining intervertebral disc spacing.  Slightly positive appearing coronal and sagittal balances. No gross pelvic tilt.  Remainder of exam unchanged.  21 Abd XR: dilated small bowel loops concerning for ileus  Electrocardiogram Personally Reviewed:    COORDINATION OF CARE:  Care Discussed with Consultants/Other Providers [Y]: neurosurgery MIAH Forrester, GI fellow  Prior or Outpatient Records Reviewed [Y/N]:   [facility] Division of Hospital Medicine  Huyen Campos MD  Pager (M-F, 5D-3X): [phone]  Other Times:  [phone]    PCP: Dr. Clint Mondragon    HPI:  51 yo female, active smoker (smokes 1/2 PPD, HLD, s/p cholecystectomy in  and  in  who presented with worsening low back pain associated with intermittent LLE pain. Evaluated by neurosurgeon Dr. Lundberg as outpatient and found to have L2-S1 spondylolisthesis with central foramina stenosis. No numbness, tingling, nor weakness of lower extremities b/l. Patient presented for [facility] for scheduled L4-S1 PLIF on 3/30/21. Hospital course complicated by nausea and vomiting found to have ileus. Medicine consulted for ileus management.    Interval History: patient seen and examined bedside. endorses ongoing nausea, last episode of emesis earlier this morning (clear/off yellow fluid), abd distension. passing selin amounts of flatus but no BM since prior to surgery; typically has BM daily.    PAST MEDICAL & SURGICAL HISTORY:  Hyperlipemia  Not taking meds currently    S/P cholecystectomy      S/P           Review of Systems:   CONSTITUTIONAL: No fever, chills  HEENT: No sore throat, vision changes  RESPIRATORY: No cough, wheezing, shortness of breath  CARDIOVASCULAR: No chest pain, palpitations, leg edema  GASTROINTESTINAL: +nausea, +NBNB emesis, +abd distension, +constipation  GENITOURINARY: No dysuria, frequency, hematuria  NEUROLOGICAL: No headaches, memory loss, loss of strength,, or tremors, +R 5th phalanx numbness  SKIN: No itching, burning, rashes, or lesions   MUSCULOSKELETAL: No joint pain or swelling; No muscle, back, or extremity pain  PSYCHIATRIC: No depression, anxiety  HEME/LYMPH: No easy bruising, or bleeding gums      Allergies    No Known Allergies    Intolerances        Social History:   current smoker, smokes 1/2 PPD x 20  years  social EtOH use, denies drug use  working in [employer]    FAMILY HISTORY:  no history of colon ca in family    MEDICATIONS  (STANDING):  acetaminophen  IVPB .. 1000 milliGRAM(s) IV Intermittent once  enoxaparin Injectable 40 milliGRAM(s) SubCutaneous daily  multivitamin 1 Tablet(s) Oral daily  saline laxative (FLEET) Rectal Enema 1 Enema Rectal once  senna 2 Tablet(s) Oral at bedtime  sodium chloride 0.9% with potassium chloride 20 mEq/L 1000 milliLiter(s) (75 mL/Hr) IV Continuous <Continuous>    MEDICATIONS  (PRN):  acetaminophen    Suspension .. 650 milliGRAM(s) Oral every 6 hours PRN Temp greater or equal to 38C (100.4F), Mild Pain (1 - 3)  acetaminophen  IVPB .. 1000 milliGRAM(s) IV Intermittent once PRN Severe Pain (7 - 10)  ondansetron Injectable 4 milliGRAM(s) IV Push every 6 hours PRN Nausea        CAPILLARY BLOOD GLUCOSE        I&O's Summary    2021 07:01  -  2021 07:00  --------------------------------------------------------  IN: 1440 mL / OUT: 4 mL / NET: 1436 mL    2021 07:01  -  2021 17:51  --------------------------------------------------------  IN: 480 mL / OUT: 0 mL / NET: 480 mL        Physical Exam:  Vital Signs Last 24 Hrs  T(C): 36.7 (2021 16:03), Max: 37.4 (2021 20:43)  T(F): 98 (2021 16:03), Max: 99.4 (2021 20:43)  HR: 83 (2021 16:03) (83 - 90)  BP: 140/72 (2021 16:03) (103/62 - 140/72)  BP(mean): --  RR: 18 (2021 16:03) (18 - 18)  SpO2: 96% (2021 16:03) (94% - 97%)    CONSTITUTIONAL: NAD, well-developed, well-groomed  EYES: PERRLA; conjunctiva and sclera clear  ENMT: Moist oral mucosa, no pharyngeal injection or exudates; normal dentition  NECK: Supple, no palpable masses; no thyromegaly  RESPIRATORY: Normal respiratory effort; lungs are clear to auscultation bilaterally  CARDIOVASCULAR: Regular rate and rhythm, normal S1 and S2, no murmur/rub/gallop; No lower extremity edema; Peripheral pulses are 2+ bilaterally  ABDOMEN: Soft, +mildly distended, +tender to deep palpation, +normoactive bowel sounds  MUSCULOSKELETAL:  No clubbing or cyanosis of digits; no joint swelling or tenderness to palpation  PSYCH: A+O to person, place, and time; affect appropriate  NEUROLOGY: CN 2-12 are intact and symmetric; no gross sensory deficits with exception of L 5th phalanx numbness, 5/5 strength in upper and lower extremities throughout  SKIN: +surgical site c/d/i, mild ooze on overlying gauze    LABS:                        9.4    11.85 )-----------( 250      ( 2021 06:55 )             29.7     04-02    136  |  101  |  11  ----------------------------<  112<H>  4.0   |  26  |  0.64    Ca    8.9      2021 06:57        RADIOLOGY & ADDITIONAL TESTS:  Results Reviewed: H/H stable, no leukocytosis, electrolytes at goal, Cr within normal limits  Imaging Personally Reviewed:  3/31/21 Duplex of lower extremities: negative for acute DVT bilaterally  21 XR thoracolumbar spine:  IMPRESSION:  L4-S1 level posterior spinal fusion hardware currently place consisting of pedicle screws with interconnecting rods and 2 metallic L5-S1 level interbody disc spacer implants. Laminectomy defects also noted.  Vertical configuration surgical skin staples also overlie lower back incision site.  No compression fractures.  Hardware stabilized L5 on S1 anterolisthesis and L4-L5 retrolisthesis. Remaining vertebral body alignment maintained.  Stable remaining intervertebral disc spacing.  Slightly positive appearing coronal and sagittal balances. No gross pelvic tilt.  Remainder of exam unchanged.  21 Abd XR: dilated small bowel loops concerning for ileus  Electrocardiogram Personally Reviewed:    COORDINATION OF CARE:  Care Discussed with Consultants/Other Providers [Y]: neurosurgery MIAH Forrester, GI fellow  Prior or Outpatient Records Reviewed [Y/N]:

## 2021-04-02 NOTE — CONSULT NOTE ADULT - PROBLEM SELECTOR RECOMMENDATION 2
last BM prior to admission. passing some flatus.  - KUB reviewed with dilated small bowel loops concerning for ileus - f/u official read  - patient unable to tolerate NGT attempted by neurosurgery PA, refusing further attempts today  - GI consulted, recs appreciate  - maintain NPO status (ice chips okay)  - discontinue opiates  - trial of fleet enemas today, ideally at least 2 today  - encouraged to ambulate as tolerated  - repeat KUB in AM  - c/w zofran PRN   - discussed with patient if no BMs with fleet enemas and no improvement in symptoms, will need to revisit NGT insertion for decompression

## 2021-04-02 NOTE — DISCHARGE NOTE PROVIDER - HOSPITAL COURSE
51 y/o HDL and cholecystectomy () and  28 years ago. Endorsed low back pain and on and off LLE pain for a few years but gotten worse since 2021.  Saw PCP and referred to Dr. Lundberg, MRI + for L2-S1 spondylolisthesis with central foramina stenosis. Pt reports 2/10 pain today. Denies fecal and urinary incontinence.    Denies Covid 19 infection, sick contacts or recent travel.   Echo and stress test  as part of regular physical exam    50y Female s/p L4-S1 PLIF ON 3/30/21  No post-op complications. Patient was nauseous earlier was OOB, walking around, and she feels better. She will work with physical therapy and she continue to feel better she wants to go home 49 y/o HDL and cholecystectomy () and  28 years ago. Endorsed low back pain and on and off LLE pain for a few years but gotten worse since 2021.  Saw PCP and referred to Dr. Lundberg, MRI + for L2-S1 spondylolisthesis with central foramina stenosis. Pt reports 2/10 pain today. Denies fecal and urinary incontinence.    Denies Covid 19 infection, sick contacts or recent travel.   Echo and stress test  as part of regular physical exam    50y Female s/p L4-S1 PLIF ON 3/30/21  No post-op complications. Patient was nauseous earlier was OOB, walking around, and she feels better. She worked with physical therapy and was cleared for discharge 49 y/o HDL and cholecystectomy () and  28 years ago. Endorsed low back pain and on and off LLE pain for a few years but gotten worse since 2021.  Saw PCP and referred to Dr. Lundberg, MRI + for L2-S1 spondylolisthesis with central foramina stenosis. Pt reports 2/10 pain today. Denies fecal and urinary incontinence.    Denies Covid 19 infection, sick contacts or recent travel.   Echo and stress test  as part of regular physical exam    50y Female s/p L4-S1 PLIF ON 3/30/21  No post-op complications. Patient was nauseous earlier was OOB, walking around, and she feels better. She worked with physical therapy and was cleared for discharge; However patient continues to have bouts of vomiting will obtain an abd x-ray 49 y/o HDL and cholecystectomy () and  28 years ago. Endorsed low back pain and on and off LLE pain for a few years but gotten worse since 2021.  Saw PCP and referred to Dr. Lundberg, MRI + for L2-S1 spondylolisthesis with central foramina stenosis. Pt reports 2/10 pain today. Denies fecal and urinary incontinence.    Denies Covid 19 infection, sick contacts or recent travel.   Echo and stress test  as part of regular physical exam    50y Female s/p L4-S1 PLIF ON 3/30/21  No post-op complications. Patient was nauseous earlier was OOB, walking around, and she feels better. She worked with physical therapy and was cleared for discharge; However patient continues to have bouts of vomiting will obtain an abd x-ray    PROCEDURE: Adm 3/30 L4-S1 PLIF     POD#5    PLAN:  Neuro: s/p Ileus-had mult BM and silvana Clears well. Off pain med/narcotics. Adv diet per GI.  Leukocytosis from steroids, now trending downwards. Anemia from acute blood loss during surgery. Inc activity/OOB. DC Home today if silvana PO. Pt wants to be discharged home today.    GI note of -#Post-operative ileus - Distended loops of small bowel on abdominal x-ray (no official read) with nausea, emesis and mild abdominal distention, with passage of flatus. Ileus exacerbated by opiate analgesics and relative immobility in post-operative course. No electrolyte derangements or prior bowel surgery concerning for mechanical obstruction vs. adhesion.  #L2-S1 spondylolisthesis s/p posterior fusion #Right hand numbness #Tobacco use Recommendations: - NPO for now - NGT for gastric decompression - benefits explained in detail to patient who is currently declining placement - stop oxycodone PRN and avoid other opiate medications for pain control - acetaminophen IV PRN to max dose 4g daily for pain control - can give FLEET enema for constipation in lieu of Miralax for now - monitor electrolyte daily and correct for any deranagements - encourage ambulation - GI to follow Racheal Marr PGY-5 Gastroenterology/Hepatology Fellow Pager #[phone] E-mail [email or web address] Call [phone] to speak to answering service for on-call GI fellow 5pm-7am and weekends. Attestation Statements:  Attestation Statements: Attending supervision statement: I have personally seen and examined the patient.  I fully participated in the care of this patient.  I have made amendments to the documentation where necessary, and agree with the history, physical exam, and plan as documented by the Fellow. 50F with obesity, h/o cholecystectomy, chronic back pain now s/p lumbar fusion.  GI consulted for post-op ileus with nausea and vomiting. Today, patient reports having bowel movements prior to recommended enemas and in general, she is feeling much better. Recommend advancing her diet, continuing to hold/limit narcotics, frequent ambulation and OOB to chair, and providing a daily bowel regimen of Miralax 1-2 times daily. Thank you for this interesting consult.  Please call the advanced GI service with any questions or concerns. Electronic Signatures: Sammi Tierney)  (Signed 2021 14:21) 	Authored: Attestation Statements Co-Signer: Consult Note, Referral/Consultation, Subjective and Objective, Assessment and Recommendation  Racheal Marr (MD    Medicine note of 4/3-post-op complicated by ileus, improving. Problem: Spondylolisthesis of lumbar region.  Plan: s/p L4-S1 PLIF on 3/30/21. - post-op care as per neurosurgery - continue to avoid all opiates in setting of ileus. - IV tylenol and PO as needed for pain. Problem: Ileus, postoperative.  Plan: improving. - repeat KUB on 4/3 still with dilated bowel loops but improved from 4/2 KUB. - had BMs overnight with improvement in her nausea/vomiting. - agree with trial of clear liquid diet today. - informed patient to request fleet enema if needed. - continue to avoid all opiates (patient states tylenol is addressing her pain well at this time). - GI following, f/u their recs. Problem: Hyperlipidemia.  Plan: not on statin therapy due to statin-induced myalgias.- outpatient f/u with PCP. Problem: Prophylactic measure.  Plan: DVT ppx: lovenox  home medications reviewed. patient not on medications at home with exception of tylenol PRN. Electronic Signatures: Huyen Campos)     Respiratory: Patient instructed to use incentive spirometer [ X] YES [ ] NO              DVT ppx: [X ] SQL [ ] SQH and Venodynes [ ] Left [ ] Right [ X] Bilateral    Discharge Planning:  The patient was evaluated by PT and recommended out patient PT w/RW.

## 2021-04-02 NOTE — DISCHARGE NOTE PROVIDER - NSDCFUADDINST_GEN_ALL_CORE_FT
Please keep incision clean and dry, you can shower tomorrow with current dressing on then remove and leave incision opened to air, do not submerge wound in water for prolonged periods of time, pat dry after showering, and do not use any creams or ointments to incision.  Please do not engage in strenuous activity, heavy lifting, drive, or return to work or school until cleared by surgeon.  Please notify physician if fevers, bleeding, swelling, pain not relieved by medication, increased sluggishness or irritability, increased nausea or vomiting, inability to tolerate foods or liquids. Followup with your Private MD in 1-2 weeks.  Return to Emergency Department or contact your Neurosurgeon if any changes in mental status, weakness, numbness or tingling of extremities; difficulty swallowing; drainage or redness of wound, fever; pain in legs; difficulty urinating or constipation.  Donot restart your Aspirin or take any Motrin/NSAIDS until checking with your Neurosurgeon.  No strenous activity. No heavy lifting. Do not return to work until cleared by physician. No driving until cleared by physician.  Remove dressing on 3rd day after your surgery and leave incision open to air. You may shower on the 3rd day after you surgery.  No soaking in tub,  Donot remove steri strips if present.  Donot apply any ointements to incision.

## 2021-04-02 NOTE — PROGRESS NOTE ADULT - SUBJECTIVE AND OBJECTIVE BOX
Post-op day #3 s/p L4-S1 PLIF    OVERNIGHT EVENTS: Patient has been nauseous, vomited once, appetite has been poor no BM x 2 days    Vital Signs Last 24 Hrs  T(C): 37.1 (02 Apr 2021 08:11), Max: 37.4 (01 Apr 2021 13:12)  T(F): 98.8 (02 Apr 2021 08:11), Max: 99.4 (01 Apr 2021 20:43)  HR: 90 (02 Apr 2021 08:11) (71 - 90)  BP: 129/74 (02 Apr 2021 08:11) (103/64 - 129/74)  BP(mean): --  RR: 18 (02 Apr 2021 08:11) (18 - 18)  SpO2: 97% (02 Apr 2021 08:11) (94% - 98%)    I&O's Detail    01 Apr 2021 07:01  -  02 Apr 2021 07:00  --------------------------------------------------------  IN:    Oral Fluid: 1440 mL  Total IN: 1440 mL    OUT:    Accordian (mL): 4 mL  Total OUT: 4 mL    Total NET: 1436 mL      02 Apr 2021 07:01  -  02 Apr 2021 12:03  --------------------------------------------------------  IN:    Oral Fluid: 240 mL  Total IN: 240 mL    OUT:  Total OUT: 0 mL    Total NET: 240 mL        I&O's Summary    01 Apr 2021 07:01  -  02 Apr 2021 07:00  --------------------------------------------------------  IN: 1440 mL / OUT: 4 mL / NET: 1436 mL    02 Apr 2021 07:01  -  02 Apr 2021 12:03  --------------------------------------------------------  IN: 240 mL / OUT: 0 mL / NET: 240 mL        PHYSICAL EXAM:  Neurological:    Motor exam:         [x] Upper extremity                 D             B          T          WE       WF      HI                                               R         5/5        5/5        5/5       5/5     5/5       5/5                                               L          5/5        5/5        5/5       5/5     5/5       5/5         [x] Lower extremity                Ps          Ha        Quad    EHL        FHL                                               R        5/5        5/5        5/5       5/5         5/5                                               L         5/5        5/5       5/5       5/5          5/5                                                  Sensation: [x] intact to light touch  [] decreased:       Gait    Cardiovascular: Regular  Respiratory: Clear bilaterally  Gastrointestinal: Abd soft + BS non tender  Genitourinary:  Extremities: -C/C/E  Incision/Wound: Intact, HMV d/nicolette new dressing applied    TUBES/LINES:  [] CVC  [] A-line  [] Lumbar Drain  [] Ventriculostomy  [] Other    DIET: Regular  [] NPO  [] Mechanical  [] Tube feeds    LABS:                        9.4    11.85 )-----------( 250      ( 02 Apr 2021 06:55 )             29.7     04-02    136  |  101  |  11  ----------------------------<  112<H>  4.0   |  26  |  0.64    Ca    8.9      02 Apr 2021 06:57      CAPILLARY BLOOD GLUCOSE      Drug Levels: [] N/A    CSF Analysis: [] N/A      Allergies    No Known Allergies    Intolerances      MEDICATIONS:  Antibiotics:    Neuro:  acetaminophen    Suspension .. 650 milliGRAM(s) Oral every 6 hours PRN  acetaminophen  IVPB .. 1000 milliGRAM(s) IV Intermittent once PRN  diazepam    Tablet 5 milliGRAM(s) Oral every 6 hours PRN  ondansetron Injectable 4 milliGRAM(s) IV Push every 6 hours PRN  oxyCODONE    Solution 5 milliGRAM(s) Oral every 4 hours PRN  oxyCODONE    Solution 10 milliGRAM(s) Oral every 4 hours PRN    Anticoagulation:  enoxaparin Injectable 40 milliGRAM(s) SubCutaneous daily    OTHER:  polyethylene glycol 3350 17 Gram(s) Oral two times a day  senna 2 Tablet(s) Oral at bedtime    IVF:  multivitamin 1 Tablet(s) Oral daily    CULTURES:    RADIOLOGY & ADDITIONAL TESTS:

## 2021-04-02 NOTE — DISCHARGE NOTE PROVIDER - NSDCMRMEDTOKEN_GEN_ALL_CORE_FT
acetaminophen 160 mg/5 mL oral suspension: 20.31 milliliter(s) orally every 6 hours, As needed, Temp greater or equal to 38C (100.4F), Mild Pain (1 - 3)  Multiple Vitamins oral tablet: 1 tab(s) orally once a day  polyethylene glycol 3350 oral powder for reconstitution: 17 gram(s) orally 2 times a day  rolling walker s/p spine surgery :   senna oral tablet: 2 tab(s) orally once a day (at bedtime)   acetaminophen 160 mg/5 mL oral suspension: 20.31 milliliter(s) orally every 6 hours, As needed, Temp greater or equal to 38C (100.4F), Mild Pain (1 - 3)  Multiple Vitamins oral tablet: 1 tab(s) orally once a day  oxyCODONE 5 mg/5 mL oral solution: 5 milliliter(s) orally every 4 to 6 hours, As Needed -Moderate Pain (4 - 6) MDD:30  polyethylene glycol 3350 oral powder for reconstitution: 17 gram(s) orally 2 times a day  rolling walker s/p spine surgery :   senna oral tablet: 2 tab(s) orally once a day (at bedtime)   acetaminophen 160 mg/5 mL oral suspension: 20.31 milliliter(s) orally every 6 hours, As needed, Temp greater or equal to 38C (100.4F), Mild Pain (1 - 3)  Multiple Vitamins oral tablet: 1 tab(s) orally once a day  polyethylene glycol 3350 oral powder for reconstitution: 17 gram(s) orally 2 times a day  rolling walker s/p spine surgery :   senna oral tablet: 2 tab(s) orally once a day (at bedtime)  Ultram 50 mg oral tablet: 0.5 to 1 tab(s) orally every 4 hours for moderate to severe pain as needed MDD:5

## 2021-04-02 NOTE — PROVIDER CONTACT NOTE (OTHER) - SITUATION
pt reports tingling in B/L ring and pinky fingers
pt bed saturated in blood. Hemovac drain got disconnected

## 2021-04-02 NOTE — DISCHARGE NOTE PROVIDER - NSDCCPCAREPLAN_GEN_ALL_CORE_FT
PRINCIPAL DISCHARGE DIAGNOSIS  Diagnosis: Lumbar radiculopathy  Assessment and Plan of Treatment: L4-S1 PLIF  Will d/c home with out patient PT

## 2021-04-02 NOTE — CONSULT NOTE ADULT - PROBLEM SELECTOR RECOMMENDATION 9
s/p L4-S1 PLIF on 3/30/21  - post-op care as per neurosurgery  - discontinue all opiates in setting of ileus  - IV tylenol as needed for pain

## 2021-04-03 LAB
ANION GAP SERPL CALC-SCNC: 9 MMOL/L — SIGNIFICANT CHANGE UP (ref 5–17)
BUN SERPL-MCNC: 11 MG/DL — SIGNIFICANT CHANGE UP (ref 7–23)
CALCIUM SERPL-MCNC: 8.7 MG/DL — SIGNIFICANT CHANGE UP (ref 8.4–10.5)
CHLORIDE SERPL-SCNC: 106 MMOL/L — SIGNIFICANT CHANGE UP (ref 96–108)
CO2 SERPL-SCNC: 27 MMOL/L — SIGNIFICANT CHANGE UP (ref 22–31)
CREAT SERPL-MCNC: 0.59 MG/DL — SIGNIFICANT CHANGE UP (ref 0.5–1.3)
GLUCOSE SERPL-MCNC: 115 MG/DL — HIGH (ref 70–99)
HCT VFR BLD CALC: 28.4 % — LOW (ref 34.5–45)
HGB BLD-MCNC: 9.1 G/DL — LOW (ref 11.5–15.5)
MCHC RBC-ENTMCNC: 29.6 PG — SIGNIFICANT CHANGE UP (ref 27–34)
MCHC RBC-ENTMCNC: 32 GM/DL — SIGNIFICANT CHANGE UP (ref 32–36)
MCV RBC AUTO: 92.5 FL — SIGNIFICANT CHANGE UP (ref 80–100)
NRBC # BLD: 0 /100 WBCS — SIGNIFICANT CHANGE UP (ref 0–0)
PLATELET # BLD AUTO: 299 K/UL — SIGNIFICANT CHANGE UP (ref 150–400)
POTASSIUM SERPL-MCNC: 3.8 MMOL/L — SIGNIFICANT CHANGE UP (ref 3.5–5.3)
POTASSIUM SERPL-SCNC: 3.8 MMOL/L — SIGNIFICANT CHANGE UP (ref 3.5–5.3)
RBC # BLD: 3.07 M/UL — LOW (ref 3.8–5.2)
RBC # FLD: 13.3 % — SIGNIFICANT CHANGE UP (ref 10.3–14.5)
SODIUM SERPL-SCNC: 142 MMOL/L — SIGNIFICANT CHANGE UP (ref 135–145)
WBC # BLD: 10.73 K/UL — HIGH (ref 3.8–10.5)
WBC # FLD AUTO: 10.73 K/UL — HIGH (ref 3.8–10.5)

## 2021-04-03 PROCEDURE — 99233 SBSQ HOSP IP/OBS HIGH 50: CPT

## 2021-04-03 PROCEDURE — 99222 1ST HOSP IP/OBS MODERATE 55: CPT | Mod: GC

## 2021-04-03 PROCEDURE — 74018 RADEX ABDOMEN 1 VIEW: CPT | Mod: 26

## 2021-04-03 RX ORDER — POLYETHYLENE GLYCOL 3350 17 G/17G
17 POWDER, FOR SOLUTION ORAL DAILY
Refills: 0 | Status: DISCONTINUED | OUTPATIENT
Start: 2021-04-03 | End: 2021-04-04

## 2021-04-03 RX ADMIN — Medication 650 MILLIGRAM(S): at 15:15

## 2021-04-03 RX ADMIN — DEXTROSE MONOHYDRATE, SODIUM CHLORIDE, AND POTASSIUM CHLORIDE 75 MILLILITER(S): 50; .745; 4.5 INJECTION, SOLUTION INTRAVENOUS at 05:38

## 2021-04-03 RX ADMIN — Medication 400 MILLIGRAM(S): at 21:54

## 2021-04-03 RX ADMIN — Medication 1 TABLET(S): at 12:20

## 2021-04-03 RX ADMIN — Medication 650 MILLIGRAM(S): at 14:48

## 2021-04-03 RX ADMIN — Medication 400 MILLIGRAM(S): at 05:28

## 2021-04-03 RX ADMIN — Medication 1000 MILLIGRAM(S): at 05:38

## 2021-04-03 RX ADMIN — Medication 650 MILLIGRAM(S): at 09:00

## 2021-04-03 RX ADMIN — ENOXAPARIN SODIUM 40 MILLIGRAM(S): 100 INJECTION SUBCUTANEOUS at 12:20

## 2021-04-03 RX ADMIN — Medication 1000 MILLIGRAM(S): at 22:10

## 2021-04-03 NOTE — PROGRESS NOTE ADULT - ASSESSMENT
HPI:  49 y/o HDL and cholecystectomy () and  28 years ago. Endorsed low back pain and on and off LLE pain for a few years but gotten worse since 2021.  Saw PCP and referred to Dr. Lundberg, MRI + for L2-S1 spondylolisthesis with central foramina stenosis. Pt reports 2/10 pain today. Denies fecal and urinary incontinence.    Denies Covid 19 infection, sick contacts or recent travel.   Echo and stress test 2019 as part of regular physical exam     (22 Mar 2021 09:00)    PROCEDURE: Lumbar fusion  s/p L4-S1 PLIF on 3/30      POD#  3  PLAN:  Neuro:   1 out of bed   2 continue pt/ot   3 prn tylenol for pain    Respiratory:   1 Room air     CV:  1 bp stable     Endocrine:    1 stable     Heme/Onc:             DVT ppx: sql and scds   1 acute blood loss anemia -stable     Renal:  1 voiding      ID:   1 afebrile     GI:   1 ileus improving had 2 bms -abdomen soft -on clears now f/u gi   2 off opioids   3 continue miralax and senna     Social/Family:   Discharge planning: dc home once medically stable     -will discuss with Dr. Lundberg   -[place]on [id number]

## 2021-04-03 NOTE — PROGRESS NOTE ADULT - ASSESSMENT
49 yo female, active smoker (smokes 1/2 PPD), HLD, s/p cholecystectomy in  and  in  who presented with worsening low back pain now s/p L4-S1 PLIF on 3/30/21 with post-op complicated by ileus, improving.

## 2021-04-03 NOTE — PROGRESS NOTE ADULT - SUBJECTIVE AND OBJECTIVE BOX
[facility] Division of Hospital Medicine  Huyen Campos MD  Pager (M-F, 8A-5P): [phone]  Other Times:  [phone]      Patient is a 50y old  Female who presents with a chief complaint of Patient was admitted with h/p low back pain and lle radicular pain. (03 Apr 2021 12:44)      SUBJECTIVE / OVERNIGHT EVENTS: had 2 moderate volume BMs in addition to 2 more small BMs overnight (prior to receiving fleet enema) with relief. denies any nausea, vomiting. abd tenderness significantly improved and abd less distended.   ADDITIONAL REVIEW OF SYSTEMS:    MEDICATIONS  (STANDING):  enoxaparin Injectable 40 milliGRAM(s) SubCutaneous daily  multivitamin 1 Tablet(s) Oral daily  polyethylene glycol 3350 17 Gram(s) Oral daily  senna 2 Tablet(s) Oral at bedtime    MEDICATIONS  (PRN):  acetaminophen    Suspension .. 650 milliGRAM(s) Oral every 6 hours PRN Temp greater or equal to 38C (100.4F), Mild Pain (1 - 3)  acetaminophen  IVPB .. 1000 milliGRAM(s) IV Intermittent once PRN Severe Pain (7 - 10)  ondansetron Injectable 4 milliGRAM(s) IV Push every 6 hours PRN Nausea      CAPILLARY BLOOD GLUCOSE        I&O's Summary    02 Apr 2021 07:01  -  03 Apr 2021 07:00  --------------------------------------------------------  IN: 1230 mL / OUT: 0 mL / NET: 1230 mL    03 Apr 2021 07:01  -  03 Apr 2021 12:57  --------------------------------------------------------  IN: 180 mL / OUT: 0 mL / NET: 180 mL        PHYSICAL EXAM:  Vital Signs Last 24 Hrs  T(C): 36.9 (03 Apr 2021 09:53), Max: 37.2 (02 Apr 2021 13:20)  T(F): 98.5 (03 Apr 2021 09:53), Max: 99 (02 Apr 2021 13:20)  HR: 84 (03 Apr 2021 09:53) (83 - 94)  BP: 101/59 (03 Apr 2021 09:53) (90/61 - 140/72)  BP(mean): --  RR: 18 (03 Apr 2021 09:53) (18 - 18)  SpO2: 97% (03 Apr 2021 09:53) (94% - 97%)    CONSTITUTIONAL: NAD, well-developed, well-groomed  EYES: PERRLA; conjunctiva and sclera clear  ENMT: Moist oral mucosa, no pharyngeal injection or exudates; normal dentition  NECK: Supple, no palpable masses; no thyromegaly  RESPIRATORY: Normal respiratory effort; lungs are clear to auscultation bilaterally  CARDIOVASCULAR: Regular rate and rhythm, normal S1 and S2, no murmur/rub/gallop; No lower extremity edema; Peripheral pulses are 2+ bilaterally  ABDOMEN: Soft, non-distended, +normoactive bowel sounds, only mild tenderness with deep palpation but improved form day prior  MUSCULOSKELETAL:  No clubbing or cyanosis of digits; no joint swelling or tenderness to palpation  PSYCH: A+O to person, place, and time; affect appropriate  NEUROLOGY: CN 2-12 are intact and symmetric; no gross sensory deficits with exception of L 5th phalanx numbness, 5/5 strength in upper and lower extremities throughout  SKIN: +surgical site c/d/i    LABS:                        9.1    10.73 )-----------( 299      ( 03 Apr 2021 06:36 )             28.4     04-03    142  |  106  |  11  ----------------------------<  115<H>  3.8   |  27  |  0.59    Ca    8.7      03 Apr 2021 06:35      RADIOLOGY & ADDITIONAL TESTS:  Results Reviewed: no leukocytosis, H/H stable, Cr within normal limits  Imaging Personally Reviewed:   4/3/1 repeat KUB still with areas of dilated small bowel but improved from yesterday  Electrocardiogram Personally Reviewed:    COORDINATION OF CARE:  Care Discussed with Consultants/Other Providers [Y]: Neurosurgery MIAH Elliott  Prior or Outpatient Records Reviewed [Y]: GI consult note

## 2021-04-03 NOTE — PROGRESS NOTE ADULT - SUBJECTIVE AND OBJECTIVE BOX
SUBJECTIVE:   Patient was seen and evaluated bedside. Patient is resting in bed and is in no new acute distress.   OVERNIGHT EVENTS:   none   Vital Signs Last 24 Hrs  T(C): 36.9 (03 Apr 2021 09:53), Max: 37.2 (02 Apr 2021 13:20)  T(F): 98.5 (03 Apr 2021 09:53), Max: 99 (02 Apr 2021 13:20)  HR: 84 (03 Apr 2021 09:53) (83 - 94)  BP: 101/59 (03 Apr 2021 09:53) (90/61 - 140/72)  BP(mean): --  RR: 18 (03 Apr 2021 09:53) (18 - 18)  SpO2: 97% (03 Apr 2021 09:53) (94% - 97%)    PHYSICAL EXAM:    General: No Acute Distress     Neurological: Awake, alert oriented to person, place and time, Following Commands, PERRL, EOMI, Face Symmetrical, Speech Fluent, Moving all extremities, Muscle Strength normal in all four extremities, No Drift, Sensation to Light Touch Intact    Pulmonary: Clear to Auscultation, No Rales, No Rhonchi, No Wheezes     Cardiovascular: S1, S2, Regular Rate and Rhythm     Gastrointestinal: Soft, Nontender, Nondistended     Incision:   clean and dry   LABS:                        9.1    10.73 )-----------( 299      ( 03 Apr 2021 06:36 )             28.4    04-03    142  |  106  |  11  ----------------------------<  115<H>  3.8   |  27  |  0.59    Ca    8.7      03 Apr 2021 06:35          04-02 @ 07:01 - 04-03 @ 07:00  --------------------------------------------------------  IN: 1230 mL / OUT: 0 mL / NET: 1230 mL    04-03 @ 07:01 - 04-03 @ 12:44  --------------------------------------------------------  IN: 180 mL / OUT: 0 mL / NET: 180 mL      DRAINS:     MEDICATIONS:  Antibiotics:    Neuro:  acetaminophen    Suspension .. 650 milliGRAM(s) Oral every 6 hours PRN Temp greater or equal to 38C (100.4F), Mild Pain (1 - 3)  acetaminophen  IVPB .. 1000 milliGRAM(s) IV Intermittent once PRN Severe Pain (7 - 10)  ondansetron Injectable 4 milliGRAM(s) IV Push every 6 hours PRN Nausea    Cardiac:    Pulm:    GI/:  senna 2 Tablet(s) Oral at bedtime    Other:   enoxaparin Injectable 40 milliGRAM(s) SubCutaneous daily  multivitamin 1 Tablet(s) Oral daily  sodium chloride 0.9% with potassium chloride 20 mEq/L 1000 milliLiter(s) IV Continuous <Continuous>    DIET: [] Regular [] CCD [] Renal [] Puree [] Dysphagia [] Tube Feeds:   clears   IMAGING:   no new imaging today   4/3 abd xray- improved ileus

## 2021-04-04 ENCOUNTER — TRANSCRIPTION ENCOUNTER (OUTPATIENT)
Age: 51
End: 2021-04-04

## 2021-04-04 VITALS
SYSTOLIC BLOOD PRESSURE: 132 MMHG | OXYGEN SATURATION: 95 % | DIASTOLIC BLOOD PRESSURE: 84 MMHG | HEART RATE: 80 BPM | RESPIRATION RATE: 18 BRPM | TEMPERATURE: 98 F

## 2021-04-04 LAB
ANION GAP SERPL CALC-SCNC: 9 MMOL/L — SIGNIFICANT CHANGE UP (ref 5–17)
BUN SERPL-MCNC: 9 MG/DL — SIGNIFICANT CHANGE UP (ref 7–23)
CALCIUM SERPL-MCNC: 8.7 MG/DL — SIGNIFICANT CHANGE UP (ref 8.4–10.5)
CHLORIDE SERPL-SCNC: 105 MMOL/L — SIGNIFICANT CHANGE UP (ref 96–108)
CO2 SERPL-SCNC: 26 MMOL/L — SIGNIFICANT CHANGE UP (ref 22–31)
CREAT SERPL-MCNC: 0.58 MG/DL — SIGNIFICANT CHANGE UP (ref 0.5–1.3)
GLUCOSE SERPL-MCNC: 91 MG/DL — SIGNIFICANT CHANGE UP (ref 70–99)
MAGNESIUM SERPL-MCNC: 2.2 MG/DL — SIGNIFICANT CHANGE UP (ref 1.6–2.6)
PHOSPHATE SERPL-MCNC: 2.5 MG/DL — SIGNIFICANT CHANGE UP (ref 2.5–4.5)
POTASSIUM SERPL-MCNC: 3.5 MMOL/L — SIGNIFICANT CHANGE UP (ref 3.5–5.3)
POTASSIUM SERPL-SCNC: 3.5 MMOL/L — SIGNIFICANT CHANGE UP (ref 3.5–5.3)
SODIUM SERPL-SCNC: 140 MMOL/L — SIGNIFICANT CHANGE UP (ref 135–145)

## 2021-04-04 PROCEDURE — 84100 ASSAY OF PHOSPHORUS: CPT

## 2021-04-04 PROCEDURE — 72082 X-RAY EXAM ENTIRE SPI 2/3 VW: CPT

## 2021-04-04 PROCEDURE — 97161 PT EVAL LOW COMPLEX 20 MIN: CPT

## 2021-04-04 PROCEDURE — 97116 GAIT TRAINING THERAPY: CPT

## 2021-04-04 PROCEDURE — C1889: CPT

## 2021-04-04 PROCEDURE — 76000 FLUOROSCOPY <1 HR PHYS/QHP: CPT

## 2021-04-04 PROCEDURE — 85025 COMPLETE CBC W/AUTO DIFF WBC: CPT

## 2021-04-04 PROCEDURE — 74018 RADEX ABDOMEN 1 VIEW: CPT

## 2021-04-04 PROCEDURE — 97530 THERAPEUTIC ACTIVITIES: CPT

## 2021-04-04 PROCEDURE — 80048 BASIC METABOLIC PNL TOTAL CA: CPT

## 2021-04-04 PROCEDURE — 93970 EXTREMITY STUDY: CPT

## 2021-04-04 PROCEDURE — C1713: CPT

## 2021-04-04 PROCEDURE — 99232 SBSQ HOSP IP/OBS MODERATE 35: CPT

## 2021-04-04 PROCEDURE — 83735 ASSAY OF MAGNESIUM: CPT

## 2021-04-04 PROCEDURE — 86769 SARS-COV-2 COVID-19 ANTIBODY: CPT

## 2021-04-04 PROCEDURE — 81025 URINE PREGNANCY TEST: CPT

## 2021-04-04 PROCEDURE — 82962 GLUCOSE BLOOD TEST: CPT

## 2021-04-04 PROCEDURE — 85520 HEPARIN ASSAY: CPT

## 2021-04-04 PROCEDURE — 85027 COMPLETE CBC AUTOMATED: CPT

## 2021-04-04 RX ORDER — TRAMADOL HYDROCHLORIDE 50 MG/1
1 TABLET ORAL
Qty: 42 | Refills: 0
Start: 2021-04-04 | End: 2021-04-10

## 2021-04-04 RX ORDER — POTASSIUM CHLORIDE 20 MEQ
40 PACKET (EA) ORAL ONCE
Refills: 0 | Status: COMPLETED | OUTPATIENT
Start: 2021-04-04 | End: 2021-04-04

## 2021-04-04 RX ADMIN — ENOXAPARIN SODIUM 40 MILLIGRAM(S): 100 INJECTION SUBCUTANEOUS at 12:16

## 2021-04-04 RX ADMIN — Medication 40 MILLIEQUIVALENT(S): at 08:25

## 2021-04-04 RX ADMIN — Medication 650 MILLIGRAM(S): at 08:21

## 2021-04-04 NOTE — PROGRESS NOTE ADULT - PROBLEM SELECTOR PLAN 2
improving.   - repeat KUB on 4/3 still with dilated bowel loops but improved from 4/2 KUB  - had BMs overnight with improvement in her nausea/vomiting  - agree with trial of clear liquid diet today  - informed patient to request fleet enema if needed  - continue to avoid all opiates (patient states tylenol is addressing her pain well at this time)  - GI following, f/u their recs
resolved.  - repeat KUB on 4/3 with resolved ileus  - no more nausea/vomiting. having regular BMs  - tolerated regular diet this morning with no issues

## 2021-04-04 NOTE — PROGRESS NOTE ADULT - ASSESSMENT
51 yo female, active smoker (smokes 1/2 PPD), HLD, s/p cholecystectomy in  and  in  who presented with worsening low back pain now s/p L4-S1 PLIF on 3/30/21 with post-op complicated by ileus now resolved.

## 2021-04-04 NOTE — PROGRESS NOTE ADULT - PROVIDER SPECIALTY LIST ADULT
Anesthesia
Neurosurgery
Hospitalist
Neurosurgery
Hospitalist

## 2021-04-04 NOTE — PROGRESS NOTE ADULT - PROBLEM SELECTOR PLAN 1
s/p L4-S1 PLIF on 3/30/21  - post-op care as per neurosurgery  - continue to avoid all opiates in setting of ileus  - IV tylenol and PO as needed for pain
s/p L4-S1 PLIF on 3/30/21  - post-op care as per neurosurgery  - continue to avoid all opiates in setting of ileus  - IV tylenol and PO as needed for pain

## 2021-04-04 NOTE — PROGRESS NOTE ADULT - PROBLEM SELECTOR PROBLEM 1
Spondylolisthesis of lumbar region

## 2021-04-04 NOTE — DISCHARGE NOTE NURSING/CASE MANAGEMENT/SOCIAL WORK - PATIENT PORTAL LINK FT
You can access the FollowMyHealth Patient Portal offered by [facility] by registering at the following website: [email or web address]. By joining [facility]’s FollowMyHealth portal, you will also be able to view your health information using other applications (apps) compatible with our system.

## 2021-04-04 NOTE — PROGRESS NOTE ADULT - SUBJECTIVE AND OBJECTIVE BOX
[facility] Division of Hospital Medicine  Huyen Campos MD  Pager (M-F, 8A-5P): [phone]  Other Times:  [phone]      Patient is a 50y old  Female who presents with a chief complaint of low back pain (03 Apr 2021 12:57)      SUBJECTIVE / OVERNIGHT EVENTS: no acute events overnight. KUB read from yesterday with no evidence of SBO. continues to have good BMs after I saw her yesterday. had regular diet for breakfast which she tolerated well. no n/v abd pain.  ADDITIONAL REVIEW OF SYSTEMS:    MEDICATIONS  (STANDING):  enoxaparin Injectable 40 milliGRAM(s) SubCutaneous daily  multivitamin 1 Tablet(s) Oral daily  polyethylene glycol 3350 17 Gram(s) Oral daily  senna 2 Tablet(s) Oral at bedtime    MEDICATIONS  (PRN):  acetaminophen    Suspension .. 650 milliGRAM(s) Oral every 6 hours PRN Temp greater or equal to 38C (100.4F), Mild Pain (1 - 3)  ondansetron Injectable 4 milliGRAM(s) IV Push every 6 hours PRN Nausea  saline laxative (FLEET) Rectal Enema 1 Enema Rectal daily PRN constipation      CAPILLARY BLOOD GLUCOSE        I&O's Summary    03 Apr 2021 07:01  -  04 Apr 2021 07:00  --------------------------------------------------------  IN: 840 mL / OUT: 600 mL / NET: 240 mL        PHYSICAL EXAM:  Vital Signs Last 24 Hrs  T(C): 36.9 (04 Apr 2021 09:52), Max: 37.1 (04 Apr 2021 05:08)  T(F): 98.4 (04 Apr 2021 09:52), Max: 98.8 (04 Apr 2021 05:08)  HR: 80 (04 Apr 2021 09:52) (79 - 83)  BP: 132/84 (04 Apr 2021 09:52) (102/65 - 132/84)  BP(mean): --  RR: 18 (04 Apr 2021 09:52) (17 - 18)  SpO2: 95% (04 Apr 2021 09:52) (95% - 98%)    CONSTITUTIONAL: NAD, well-developed, well-groomed  EYES: PERRLA; conjunctiva and sclera clear  ENMT: Moist oral mucosa, no pharyngeal injection or exudates; normal dentition  NECK: Supple, no palpable masses; no thyromegaly  RESPIRATORY: Normal respiratory effort; lungs are clear to auscultation bilaterally  CARDIOVASCULAR: Regular rate and rhythm, normal S1 and S2, no murmur/rub/gallop; No lower extremity edema; Peripheral pulses are 2+ bilaterally  ABDOMEN: Soft, non-distended, +normoactive bowel sounds, no longer tender to palpation  MUSCULOSKELETAL:  No clubbing or cyanosis of digits; no joint swelling or tenderness to palpation  PSYCH: A+O to person, place, and time; affect appropriate  NEUROLOGY: CN 2-12 are intact and symmetric; no gross sensory deficits with exception of L 5th phalanx numbness, 5/5 strength in upper and lower extremities throughout  SKIN: +surgical site c/d/i    LABS:                        9.1    10.73 )-----------( 299      ( 03 Apr 2021 06:36 )             28.4     04-04    140  |  105  |  9   ----------------------------<  91  3.5   |  26  |  0.58    Ca    8.7      04 Apr 2021 07:16  Phos  2.5     04-04  Mg     2.2     04-04        RADIOLOGY & ADDITIONAL TESTS:  Results Reviewed: no leukocytosis, H/H stable, mild hypokalemia - repleted  Imaging Personally Reviewed:  4/3/21 KUB:   IMPRESSION:  No evidence for bowel obstruction.  No free air.    Electrocardiogram Personally Reviewed:    COORDINATION OF CARE:  Care Discussed with Consultants/Other Providers [Y]: neurosurgery MIAH Stein  Prior or Outpatient Records Reviewed [Y/N]:

## 2021-04-04 NOTE — PROGRESS NOTE ADULT - PROBLEM SELECTOR PLAN 3
not on statin therapy due to statin-induced myalgias.  - outpatient f/u with PCP
not on statin therapy due to statin-induced myalgias.  - outpatient f/u with PCP

## 2021-04-04 NOTE — PROGRESS NOTE ADULT - PROBLEM SELECTOR PLAN 4
DVT ppx: lovenox  home medications reviewed. patient not on medications at home with exception of tylenol PRN.    Plan discussed with patient and neurosurgery MIAH Elliott.
DVT ppx: lovenox  home medications reviewed. patient not on medications at home with exception of tylenol PRN.    Plan discussed with patient and neurosurgery MIAH Stein.

## 2021-04-04 NOTE — DISCHARGE NOTE NURSING/CASE MANAGEMENT/SOCIAL WORK - NSDCFUADDAPPT_GEN_ALL_CORE_FT
Please call your Neurosurgeon for an appointment within 1 week after your hospital discharge for wound check and further recommendations.    Followup with your Scott MD in 1 week

## 2021-04-04 NOTE — PROGRESS NOTE ADULT - SUBJECTIVE AND OBJECTIVE BOX
SUBJECTIVE: In chair. Doing well, +BM. Iraj Clears. No N/V. Wants to go home today.    OVERNIGHT EVENTS: None    Vital Signs Last 24 Hrs  T(C): 37.1 (04 Apr 2021 05:08), Max: 37.1 (04 Apr 2021 05:08)  T(F): 98.8 (04 Apr 2021 05:08), Max: 98.8 (04 Apr 2021 05:08)  HR: 80 (04 Apr 2021 05:08) (79 - 84)  BP: 110/68 (04 Apr 2021 05:08) (101/59 - 121/65)  BP(mean): --  RR: 17 (04 Apr 2021 05:08) (17 - 18)  SpO2: 95% (04 Apr 2021 05:08) (95% - 98%)  IVF: [X ] IVL [ ] NS+K@   DIET: [X ] Clear Liq [ ] CCD [ ] Renal [ ] Puree [ ] Dysphagia [ ] Tube Feeds:   PCA: [ ] YES [ X] NO   CALLEJAS: [ ] YES [X ] NO [X ] VOID   BM: [ X] YES x5 over past 2days    DRAINS: None    PHYSICAL EXAM:    Constitutional: No Acute Distress     Neurological: AAOx3, Following Commands, Moving all Extremities     Motor exam:          Upper extremity                         Delt     Bicep     Tricep    HG                                                 R         5/5        5/5        5/5       5/5                                               L          5/5        5/5        5/5       5/5          Lower extremity                        HF         KF        KE       DF         PF                                                  R        5/5        5/5        5/5       5/5         5/5                                               L         5/5        5/5       5/5       5/5          5/5                                                 Sensation: [X] intact to light touch  [] decreased:     Pulmonary: Clear to Auscultation, No rales, No rhonchi, No wheezes     Cardiovascular: S1, S2, Regular rate and rhythm     Gastrointestinal: Soft, Non-tender, Non-distended     Extremities: No calf tenderness     Incision: +staples, CDI/Flat    LABS:                        9.1    10.73 )-----------( 299      ( 03 Apr 2021 06:36 )             28.4    04-04    140  |  105  |  9   ----------------------------<  91  3.5   |  26  |  0.58    Ca    8.7      04 Apr 2021 07:16  Phos  2.5     04-04  Mg     2.2     04-04    COVID-19 PCR: NotDetec (27 Mar 2021 11:25)    IMAGING:  < from: Xray Abdomen 1 View PORTABLE -Routine (Xray Abdomen 1 View PORTABLE -Routine in AM.) (04.03.21 @ 08:34) >  IMPRESSION:  No evidence for bowel obstruction.  No free air.    MEDICATIONS  (STANDING):  enoxaparin Injectable 40 milliGRAM(s) SubCutaneous daily  multivitamin 1 Tablet(s) Oral daily  polyethylene glycol 3350 17 Gram(s) Oral daily  senna 2 Tablet(s) Oral at bedtime    MEDICATIONS  (PRN):  acetaminophen    Suspension .. 650 milliGRAM(s) Oral every 6 hours PRN Temp greater or equal to 38C (100.4F), Mild Pain (1 - 3)  ondansetron Injectable 4 milliGRAM(s) IV Push every 6 hours PRN Nausea  saline laxative (FLEET) Rectal Enema 1 Enema Rectal daily PRN constipation

## 2021-04-04 NOTE — PROGRESS NOTE ADULT - ASSESSMENT
49 y/o HDL and cholecystectomy () and  28 years ago. Endorsed low back pain and on and off LLE pain for a few years but gotten worse since 2021.  Saw PCP and referred to Dr. Lundberg, MRI + for L2-S1 spondylolisthesis with central foramina stenosis. Pt reports 2/10 pain today. Denies fecal and urinary incontinence.    Denies Covid 19 infection, sick contacts or recent travel.   Echo and stress test 2019 as part of regular physical exam     (22 Mar 2021 09:00)    PROCEDURE: Adm 3/30 L4-S1 PLIF     POD#5    PLAN:  Neuro: s/p Ileus-had mult BM and silvana Clears well. Off pain med/narcotics. Adv diet per GI.  Leukocytosis from steroids, now trending downwards. Anemia from acute blood loss during surgery. Inc activity/OOB. DC Home today if silvana PO. Pt wants to be discharged home today.    GI note of -#Post-operative ileus - Distended loops of small bowel on abdominal x-ray (no official read) with nausea, emesis and mild abdominal distention, with passage of flatus. Ileus exacerbated by opiate analgesics and relative immobility in post-operative course. No electrolyte derangements or prior bowel surgery concerning for mechanical obstruction vs. adhesion.  #L2-S1 spondylolisthesis s/p posterior fusion #Right hand numbness #Tobacco use Recommendations: - NPO for now - NGT for gastric decompression - benefits explained in detail to patient who is currently declining placement - stop oxycodone PRN and avoid other opiate medications for pain control - acetaminophen IV PRN to max dose 4g daily for pain control - can give FLEET enema for constipation in lieu of Miralax for now - monitor electrolyte daily and correct for any deranagements - encourage ambulation - GI to follow Racheal Marr PGY-5 Gastroenterology/Hepatology Fellow Pager #[phone] E-mail [email or web address] Call [phone] to speak to answering service for on-call GI fellow 5pm-7am and weekends. Attestation Statements:  Attestation Statements: Attending supervision statement: I have personally seen and examined the patient.  I fully participated in the care of this patient.  I have made amendments to the documentation where necessary, and agree with the history, physical exam, and plan as documented by the Fellow. 50F with obesity, h/o cholecystectomy, chronic back pain now s/p lumbar fusion.  GI consulted for post-op ileus with nausea and vomiting. Today, patient reports having bowel movements prior to recommended enemas and in general, she is feeling much better. Recommend advancing her diet, continuing to hold/limit narcotics, frequent ambulation and OOB to chair, and providing a daily bowel regimen of Miralax 1-2 times daily. Thank you for this interesting consult.  Please call the advanced GI service with any questions or concerns. Electronic Signatures: Sammi Tierney)  (Signed 2021 14:21) 	Authored: Attestation Statements Co-Signer: Consult Note, Referral/Consultation, Subjective and Objective, Assessment and Recommendation  Racheal Marr (MD    Medicine note of 4/3-post-op complicated by ileus, improving. Problem: Spondylolisthesis of lumbar region.  Plan: s/p L4-S1 PLIF on 3/30/21. - post-op care as per neurosurgery - continue to avoid all opiates in setting of ileus. - IV tylenol and PO as needed for pain. Problem: Ileus, postoperative.  Plan: improving. - repeat KUB on 4/3 still with dilated bowel loops but improved from 4/2 KUB. - had BMs overnight with improvement in her nausea/vomiting. - agree with trial of clear liquid diet today. - informed patient to request fleet enema if needed. - continue to avoid all opiates (patient states tylenol is addressing her pain well at this time). - GI following, f/u their recs. Problem: Hyperlipidemia.  Plan: not on statin therapy due to statin-induced myalgias.- outpatient f/u with PCP. Problem: Prophylactic measure.  Plan: DVT ppx: lovenox  home medications reviewed. patient not on medications at home with exception of tylenol PRN. Electronic Signatures: Huyen Campos)     Respiratory: Patient instructed to use incentive spirometer [ X] YES [ ] NO              DVT ppx: [X ] SQL [ ] SQH and Venodynes [ ] Left [ ] Right [ X] Bilateral    Discharge Planning:  The patient was evaluated by PT and recommended out patient PTw/RW.    More than 30 minutes spent on total encounter: more than 50% of the visit was spent on educating the patient and family regarding condition, medications, follow up plans, signs and symptoms to be concerned with, preparing paperwork, and questions answered regarding discharge.

## 2021-04-06 ENCOUNTER — TRANSCRIPTION ENCOUNTER (OUTPATIENT)
Age: 51
End: 2021-04-06

## 2021-04-12 ENCOUNTER — APPOINTMENT (OUTPATIENT)
Dept: SPINE | Facility: CLINIC | Age: 51
End: 2021-04-12
Payer: COMMERCIAL

## 2021-04-12 VITALS
SYSTOLIC BLOOD PRESSURE: 120 MMHG | HEIGHT: 65 IN | BODY MASS INDEX: 23.32 KG/M2 | HEART RATE: 82 BPM | WEIGHT: 140 LBS | OXYGEN SATURATION: 92 % | DIASTOLIC BLOOD PRESSURE: 68 MMHG | TEMPERATURE: 97.2 F

## 2021-04-12 PROCEDURE — 99024 POSTOP FOLLOW-UP VISIT: CPT

## 2021-04-13 NOTE — REASON FOR VISIT
[de-identified] : 3/30/2021 [de-identified] : 14 [de-identified] : 2 [de-identified] : L 4  L 5 , L 5  S 1 bilateral laminectomy and facetectomy and L 5  S1 combined interbody onlay arthrodesis , L 4 L5  S 1 segmental screw xiang fixation and L 5 S 1 FORZA titanium expandable interbody  prosthesis [Other: _____] : [unfilled]

## 2021-04-13 NOTE — ASSESSMENT
[FreeTextEntry1] : 50 year old female with lumbar spondylolisthesis and postop L 4 L 5 S 1 laminectomy and fusion.  Lower back pain improved. She may return to work in two months.  In three weeks she will return to the office with lumbar xrays in three weeks.   No B L T .  Increase walking.  Keep the incision clean and dry.

## 2021-04-13 NOTE — PHYSICAL EXAM
[General Appearance - Alert] : alert [General Appearance - In No Acute Distress] : in no acute distress [General Appearance - Well Nourished] : well nourished [General Appearance - Well Developed] : well developed [General Appearance - Well-Appearing] : healthy appearing [Longitudinal] : longitudinal [Clean] : clean [Dry] : dry [Healing Well] : healing well [Staple Intact] : closed with intact staples [No Drainage] : without drainage [Normal Skin] : normal [Erythema] : not erythematous [Warm] : not warm [FreeTextEntry1] : lumbar region  [Oriented To Time, Place, And Person] : oriented to person, place, and time [Impaired Insight] : insight and judgment were intact [Affect] : the affect was normal [Mood] : the mood was normal [Person] : oriented to person [Place] : oriented to place [Time] : oriented to time [Short Term Intact] : short term memory intact [Remote Intact] : remote memory intact [Registration Intact] : recent registration memory intact [Span Intact] : the attention span was normal [Concentration Intact] : normal concentrating ability [Visual Intact] : visual attention was ~T not ~L decreased [Fluency] : fluency intact [Comprehension] : comprehension intact [Reading] : reading intact [Current Events] : adequate knowledge of current events [Past History] : adequate knowledge of personal past history [Vocabulary] : adequate range of vocabulary [Cranial Nerves Accessory (XI - Cranial And Spinal)] : head turning and shoulder shrug symmetric [Motor Tone] : muscle tone was normal in all four extremities [Motor Strength] : muscle strength was normal in all four extremities [Sensation Tactile Decrease] : light touch was intact [No Visual Abnormalities] : no visible abnormailities [Intact] : all sensory within normal limits bilaterally [Sclera] : the sclera and conjunctiva were normal [Outer Ear] : the ears and nose were normal in appearance [Neck Appearance] : the appearance of the neck was normal [] : no respiratory distress [Abnormal Walk] : normal gait [Skin Color & Pigmentation] : normal skin color and pigmentation

## 2021-04-13 NOTE — HISTORY OF PRESENT ILLNESS
[FreeTextEntry1] : 2.5 weeks post L 4  L 5 , L 5 S 1 laminectomy and facetectomy with  L 4 L5  S 1 segmental screw xiang fixation.  At present she has right sided back pain rated at 4/10.  She can walk for ten minutes with a walker.  She is taking  \par Oxycodone twice a day.  No muscle relaxers were ordered due to paralytic ileus postoperatively.  She has no symptoms of GI distress.    \par \par \par

## 2021-05-03 ENCOUNTER — APPOINTMENT (OUTPATIENT)
Dept: SPINE | Facility: CLINIC | Age: 51
End: 2021-05-03
Payer: COMMERCIAL

## 2021-05-03 VITALS
WEIGHT: 200 LBS | OXYGEN SATURATION: 93 % | BODY MASS INDEX: 33.32 KG/M2 | HEART RATE: 91 BPM | SYSTOLIC BLOOD PRESSURE: 119 MMHG | TEMPERATURE: 97.7 F | DIASTOLIC BLOOD PRESSURE: 82 MMHG | HEIGHT: 65 IN

## 2021-05-03 PROCEDURE — 99024 POSTOP FOLLOW-UP VISIT: CPT

## 2021-05-03 RX ORDER — DIAZEPAM 5 MG/1
5 TABLET ORAL
Qty: 4 | Refills: 0 | Status: DISCONTINUED | COMMUNITY
Start: 2021-02-12 | End: 2021-05-03

## 2021-05-03 RX ORDER — OXYCODONE HYDROCHLORIDE 5 MG/5ML
5 SOLUTION ORAL
Qty: 210 | Refills: 0 | Status: DISCONTINUED | COMMUNITY
Start: 2021-04-02 | End: 2021-05-03

## 2021-05-04 NOTE — HISTORY OF PRESENT ILLNESS
[FreeTextEntry1] : \par \par Ms Kirby is here for a postop visit at her five week follow up after a bilateral lumbar fusion of L 4 L5 , L 5 S 1 was  performed for spondylolisthesis with foraminal stenosis grade 2.   She is taking Oxycodone on a PRN basis and is reporting burning  of her feet, more on the left.  From the burning she is having a great deal of restless sleep.  She does not  require any assistive devices for walking.  A lumbar xrays shows good alignment and hardware in place.

## 2021-05-04 NOTE — PHYSICAL EXAM
[General Appearance - Alert] : alert [General Appearance - In No Acute Distress] : in no acute distress [Longitudinal] : longitudinal [Clean] : clean [Dry] : dry [Well-Healed] : well-healed [No Drainage] : without drainage [Normal Skin] : normal [Erythema] : not erythematous [Tender] : not tender [Warm] : not warm [Indurated] : not indurated [FreeTextEntry1] : lumbar incision  [Oriented To Time, Place, And Person] : oriented to person, place, and time [Impaired Insight] : insight and judgment were intact [Affect] : the affect was normal [Mood] : the mood was normal [Person] : oriented to person [Place] : oriented to place [Time] : oriented to time [Short Term Intact] : short term memory intact [Remote Intact] : remote memory intact [Registration Intact] : recent registration memory intact [Span Intact] : the attention span was normal [Concentration Intact] : normal concentrating ability [Visual Intact] : visual attention was ~T not ~L decreased [Fluency] : fluency intact [Comprehension] : comprehension intact [Reading] : reading intact [Current Events] : adequate knowledge of current events [Past History] : adequate knowledge of personal past history [Vocabulary] : adequate range of vocabulary [Cranial Nerves Vestibulocochlear (VIII)] : hearing was intact bilaterally [Cranial Nerves Accessory (XI - Cranial And Spinal)] : head turning and shoulder shrug symmetric [Motor Tone] : muscle tone was normal in all four extremities [Motor Strength] : muscle strength was normal in all four extremities [Involuntary Movements] : no involuntary movements were seen [Sensation Tactile Decrease] : light touch was intact [Sensation Vibration Decrease] : vibration was intact [Hyperesthesia] : hyperesthesia was present [Balance] : balance was intact [No Visual Abnormalities] : no visible abnormailities [Full ROM] : full ROM [No Pain with ROM] : no pain with motion in any direction [Intact] : all sensory within normal limits bilaterally [Sclera] : the sclera and conjunctiva were normal [Outer Ear] : the ears and nose were normal in appearance [] : no respiratory distress [Abnormal Walk] : normal gait [Skin Color & Pigmentation] : normal skin color and pigmentation

## 2021-05-04 NOTE — REASON FOR VISIT
[Other: _____] : [unfilled] [de-identified] : 3/30/2021 [de-identified] : 33 [de-identified] : 5 [de-identified] : L 4 L5 , L 5 S 1 bilateral  laminectomy and facetectomy.  L 5 S 1 combined interbody onlay arthrodesis and L 4 L5 and S 1 segmental screw xiang fixation and   L 5 S 1 Forza titanium expandable interbody prosthesis.

## 2021-05-28 ENCOUNTER — NON-APPOINTMENT (OUTPATIENT)
Age: 51
End: 2021-05-28

## 2021-06-14 ENCOUNTER — APPOINTMENT (OUTPATIENT)
Dept: SPINE | Facility: CLINIC | Age: 51
End: 2021-06-14
Payer: COMMERCIAL

## 2021-06-14 VITALS
SYSTOLIC BLOOD PRESSURE: 139 MMHG | HEIGHT: 65 IN | BODY MASS INDEX: 33.32 KG/M2 | HEART RATE: 91 BPM | WEIGHT: 200 LBS | DIASTOLIC BLOOD PRESSURE: 97 MMHG | OXYGEN SATURATION: 99 % | TEMPERATURE: 98 F

## 2021-06-14 PROCEDURE — 99024 POSTOP FOLLOW-UP VISIT: CPT

## 2021-06-15 NOTE — PHYSICAL EXAM
[General Appearance - Alert] : alert [General Appearance - In No Acute Distress] : in no acute distress [Clean] : clean [Dry] : dry [Well-Healed] : well-healed [No Drainage] : without drainage [Normal Skin] : normal [Erythema] : not erythematous [Tender] : not tender [Warm] : not warm [Indurated] : not indurated [FreeTextEntry1] : lower back  [Oriented To Time, Place, And Person] : oriented to person, place, and time [Impaired Insight] : insight and judgment were intact [Affect] : the affect was normal [Person] : oriented to person [Place] : oriented to place [Time] : oriented to time [Short Term Intact] : short term memory intact [Remote Intact] : remote memory intact [Registration Intact] : recent registration memory intact [Span Intact] : the attention span was normal [Visual Intact] : visual attention was ~T not ~L decreased [Concentration Intact] : normal concentrating ability [Fluency] : fluency intact [Comprehension] : comprehension intact [Reading] : reading intact [Current Events] : adequate knowledge of current events [Past History] : adequate knowledge of personal past history [Vocabulary] : adequate range of vocabulary [Cranial Nerves Vestibulocochlear (VIII)] : hearing was intact bilaterally [Cranial Nerves Accessory (XI - Cranial And Spinal)] : head turning and shoulder shrug symmetric [Motor Tone] : muscle tone was normal in all four extremities [Motor Strength] : muscle strength was normal in all four extremities [Involuntary Movements] : no involuntary movements were seen [Sensation Tactile Decrease] : light touch was intact [Balance] : balance was intact [No Visual Abnormalities] : no visible abnormailities [Full ROM] : full ROM [Normal] : normal [Intact] : all sensory within normal limits bilaterally [Sclera] : the sclera and conjunctiva were normal [Outer Ear] : the ears and nose were normal in appearance [Neck Appearance] : the appearance of the neck was normal [] : no respiratory distress [Abnormal Walk] : normal gait [Skin Color & Pigmentation] : normal skin color and pigmentation

## 2021-06-15 NOTE — ASSESSMENT
[FreeTextEntry1] : \par \par \par Ms Kirby is now 2.5 months follow up following a L 4 L5 L 5 S 1 fusion.  She has no pain and left foot numbness.  Increasing Gabapentin was discussed and instructions reviewed.  No B L T.  In three months she will return in follow up with lumbar flexion/extension xrays.  \par \par \par \par CC:\par Clint Mondragon MD

## 2021-06-15 NOTE — HISTORY OF PRESENT ILLNESS
[FreeTextEntry1] : \par \par \par Ms. Kirby is here for a neurosurgical follow up following   a L4 L5 , L 5 S 1 lumbar fusion at her 2.5 month follow up.  She reports she is pain free but her left foot remains numb on the outer aspect below her great first toe.  She is back to work without difficulty.  No radicular pain present.  She is taking Gabapentin on a PRN basis.  Lumbar xrays show good construct and hardware in place . Incision is well healed.

## 2021-06-15 NOTE — REASON FOR VISIT
[Other: _____] : [unfilled] [de-identified] : 3/30/2021 [de-identified] : 10 [de-identified] : L 4 L 5 , L 5 S 1 lumbar fusion

## 2021-09-14 ENCOUNTER — TRANSCRIPTION ENCOUNTER (OUTPATIENT)
Age: 51
End: 2021-09-14

## 2021-10-08 ENCOUNTER — TRANSCRIPTION ENCOUNTER (OUTPATIENT)
Age: 51
End: 2021-10-08

## 2021-10-11 ENCOUNTER — APPOINTMENT (OUTPATIENT)
Dept: SPINE | Facility: CLINIC | Age: 51
End: 2021-10-11

## 2021-10-27 ENCOUNTER — TRANSCRIPTION ENCOUNTER (OUTPATIENT)
Age: 51
End: 2021-10-27

## 2021-11-01 ENCOUNTER — APPOINTMENT (OUTPATIENT)
Dept: SPINE | Facility: CLINIC | Age: 51
End: 2021-11-01
Payer: COMMERCIAL

## 2021-11-01 VITALS
WEIGHT: 200 LBS | OXYGEN SATURATION: 98 % | HEART RATE: 80 BPM | BODY MASS INDEX: 33.32 KG/M2 | SYSTOLIC BLOOD PRESSURE: 129 MMHG | HEIGHT: 65 IN | DIASTOLIC BLOOD PRESSURE: 84 MMHG

## 2021-11-01 DIAGNOSIS — M54.16 RADICULOPATHY, LUMBAR REGION: ICD-10-CM

## 2021-11-01 DIAGNOSIS — Z86.39 PERSONAL HISTORY OF OTHER ENDOCRINE, NUTRITIONAL AND METABOLIC DISEASE: ICD-10-CM

## 2021-11-01 PROCEDURE — 99212 OFFICE O/P EST SF 10 MIN: CPT

## 2021-11-01 RX ORDER — GABAPENTIN 250 MG/5ML
300 SOLUTION ORAL
Qty: 100 | Refills: 1 | Status: DISCONTINUED | COMMUNITY
Start: 2021-05-03 | End: 2021-11-01

## 2021-11-02 NOTE — ASSESSMENT
[FreeTextEntry1] : \par \par 8 months post lumbar fusion L 4 to  S 1.  Left hip pain.  SHe will continue PT and return in three months with a lumbar xrays.  Avoid B L T.

## 2021-11-02 NOTE — REASON FOR VISIT
[Follow-Up: _____] : a [unfilled] follow-up visit [Other: _____] : [unfilled] [FreeTextEntry1] : \par Ms. Bates presents today for a follow up visit after undergoing L 4 to S 1 fusion 8 months ago. She has no back pain and left hip and posterior thigh pain.  She has no foot pain .  No longer is she taking Gabapentin  She is walking without difficulty. She has stopped PT.    Lumbar xrays show good alignment and construct, hardware in place.  No instability.

## 2021-11-02 NOTE — END OF VISIT
[FreeTextEntry3] : I, Dr. Oswaldo Lundberg, evaluated this patient with the Nurse Practitioner, Kim Lombardo, and established the plan of care.  I personally discussed this patient  during the key portions of the history and exam with the Nurse Practitioner at the time of the visit.  I agree with the assessment and plan as written, unless noted below.\par

## 2022-02-07 ENCOUNTER — APPOINTMENT (OUTPATIENT)
Dept: SPINE | Facility: CLINIC | Age: 52
End: 2022-02-07
Payer: COMMERCIAL

## 2022-02-07 VITALS — HEIGHT: 65 IN | WEIGHT: 200 LBS | BODY MASS INDEX: 33.32 KG/M2

## 2022-02-07 VITALS — OXYGEN SATURATION: 96 % | SYSTOLIC BLOOD PRESSURE: 105 MMHG | DIASTOLIC BLOOD PRESSURE: 64 MMHG | HEART RATE: 69 BPM

## 2022-02-07 DIAGNOSIS — M25.552 PAIN IN LEFT HIP: ICD-10-CM

## 2022-02-07 DIAGNOSIS — Z72.89 OTHER PROBLEMS RELATED TO LIFESTYLE: ICD-10-CM

## 2022-02-07 DIAGNOSIS — F17.200 NICOTINE DEPENDENCE, UNSPECIFIED, UNCOMPLICATED: ICD-10-CM

## 2022-02-07 DIAGNOSIS — M43.17 SPONDYLOLISTHESIS, LUMBOSACRAL REGION: ICD-10-CM

## 2022-02-07 DIAGNOSIS — Z98.1 ARTHRODESIS STATUS: ICD-10-CM

## 2022-02-07 DIAGNOSIS — G89.29 PAIN IN LEFT HIP: ICD-10-CM

## 2022-02-07 PROCEDURE — 99212 OFFICE O/P EST SF 10 MIN: CPT

## 2022-02-07 NOTE — REASON FOR VISIT
[Follow-Up: _____] : a [unfilled] follow-up visit [Family Member] : family member [Other: _____] : [unfilled] [FreeTextEntry1] : Ms Kirby is here today at here at her 11 month postop visit following a lumbar fusion   L 4 to S 1.  She has resolution of her back pain.  Six months ago she began to have left hip to left legs pain, denies numbness or tingling. Laying on hip is painful.  She can walk, but starts limping because of pain\par No epidurals were initiated.   No leg weakness.  Only two PT sessions were attended and she stopped over 10 months ago.  There is tightness in her left hip.  Lumbar xrays show good alignment and hardware in place.

## 2022-02-07 NOTE — PHYSICAL EXAM
[Motor Strength] : muscle strength was normal in all four extremities [Sensation Tactile Decrease] : light touch was intact [Abnormal Walk] : normal gait [FreeTextEntry1] : lb's sign positive left hip

## 2022-02-07 NOTE — ASSESSMENT
[FreeTextEntry1] : \par \par 11 month follow up  L 4 to S 1 fusion.  Left hip pain, otherwise no back or leg radiculopathy.   Repeat lumbar  xrays in one year.   PT script will be given.  Will call for ortho referral for the left hip if she needs in the future.  Avoid lifting.

## 2023-01-20 NOTE — PROGRESS NOTE ADULT - REASON FOR ADMISSION
LLE radiculopathy and Low back pain
LLE radiculopathy and Low back pain
low back pain
Patient was admitted with h/p low back pain and lle radicular pain.
LLE radiculopathy and Low back pain
No

## 2023-02-04 NOTE — ASSESSMENT
[FreeTextEntry1] : 52 year old female 23 months s/p L 4 to S 1 lumbar fusion. \par \par Follow up as needed

## 2023-02-04 NOTE — REASON FOR VISIT
[Follow-Up: _____] : a [unfilled] follow-up visit [FreeTextEntry1] : Ms Kirby underwent lumbar fusion L 4 to S 1 23 months ago. Today she reports \par \par  Pain, numbness or tingling?\par Lumbar x-rays show good alignment and hardware in place. \par

## 2023-02-06 ENCOUNTER — APPOINTMENT (OUTPATIENT)
Dept: SPINE | Facility: CLINIC | Age: 53
End: 2023-02-06

## 2023-09-26 NOTE — PHYSICAL EXAM
"RN phoned patient to discuss her concerns. Patient reports that she is still in a hotel with her dog. She is under extreme stress related to finding housing and having to give up her dog.     She reports that lorazepam is not working. \"I don't feel anything.\"     Also requests to start ritalin. A few years ago her friend gave her some to try and she \"felt really level.\" She feels like she is all over the place and cannot stay on task.     She plans on discussing this with Janet Álvarez at her appointment on Monday 10/2/23.     ABISAI VELAZQUEZ RN on 9/26/2023 at 9:27 AM    " [Motor Strength] : muscle strength was normal in all four extremities [FreeTextEntry1] : Obed sign positive left side

## 2024-05-21 NOTE — PRE-OP CHECKLIST - SITE MARKED BY SURGEON
Quality 226: Preventive Care And Screening: Tobacco Use: Screening And Cessation Intervention: Patient screened for tobacco use and is an ex/non-smoker Detail Level: Detailed n/a

## 2024-12-25 PROBLEM — F10.90 ALCOHOL USE: Status: ACTIVE | Noted: 2021-03-01

## 2025-01-31 NOTE — PROGRESS NOTE ADULT - PROBLEM SELECTOR PROBLEM 2
A STENT KEAGAN FRNTR 3MM 18MM RX COR STRL LF - [id number] was deployed in the right coronary artery.   The stent was deployed at 12 matthew for 30 seconds at 1/31/2025 2:48 PM . Stent balloon used for 2nd inflation at 12 matthew for 20 seconds.   Stent balloon used for 3rd inflation at 14 matthew for 20 seconds.
Ileus, postoperative
Ileus, postoperative

## 2025-04-22 ENCOUNTER — EMERGENCY (EMERGENCY)
Facility: HOSPITAL | Age: 55
LOS: 1 days | End: 2025-04-22
Attending: STUDENT IN AN ORGANIZED HEALTH CARE EDUCATION/TRAINING PROGRAM
Payer: COMMERCIAL

## 2025-04-22 VITALS
HEART RATE: 90 BPM | TEMPERATURE: 98 F | WEIGHT: 250 LBS | SYSTOLIC BLOOD PRESSURE: 121 MMHG | OXYGEN SATURATION: 96 % | HEIGHT: 65 IN | DIASTOLIC BLOOD PRESSURE: 86 MMHG | RESPIRATION RATE: 18 BRPM

## 2025-04-22 DIAGNOSIS — Z98.891 HISTORY OF UTERINE SCAR FROM PREVIOUS SURGERY: Chronic | ICD-10-CM

## 2025-04-22 DIAGNOSIS — Z90.49 ACQUIRED ABSENCE OF OTHER SPECIFIED PARTS OF DIGESTIVE TRACT: Chronic | ICD-10-CM

## 2025-04-22 PROCEDURE — 93971 EXTREMITY STUDY: CPT

## 2025-04-22 PROCEDURE — 93971 EXTREMITY STUDY: CPT | Mod: 26,RT

## 2025-04-22 PROCEDURE — 99284 EMERGENCY DEPT VISIT MOD MDM: CPT

## 2025-04-22 PROCEDURE — 73562 X-RAY EXAM OF KNEE 3: CPT | Mod: 26,RT

## 2025-04-22 PROCEDURE — 96372 THER/PROPH/DIAG INJ SC/IM: CPT

## 2025-04-22 PROCEDURE — 73562 X-RAY EXAM OF KNEE 3: CPT

## 2025-04-22 PROCEDURE — 99284 EMERGENCY DEPT VISIT MOD MDM: CPT | Mod: 25

## 2025-04-22 RX ORDER — IBUPROFEN 200 MG
600 TABLET ORAL ONCE
Refills: 0 | Status: COMPLETED | OUTPATIENT
Start: 2025-04-22 | End: 2025-04-22

## 2025-04-22 RX ORDER — KETOROLAC TROMETHAMINE 30 MG/ML
30 INJECTION, SOLUTION INTRAMUSCULAR; INTRAVENOUS ONCE
Refills: 0 | Status: DISCONTINUED | OUTPATIENT
Start: 2025-04-22 | End: 2025-04-22

## 2025-04-22 RX ADMIN — KETOROLAC TROMETHAMINE 30 MILLIGRAM(S): 30 INJECTION, SOLUTION INTRAMUSCULAR; INTRAVENOUS at 16:53

## 2025-04-22 RX ADMIN — KETOROLAC TROMETHAMINE 30 MILLIGRAM(S): 30 INJECTION, SOLUTION INTRAMUSCULAR; INTRAVENOUS at 16:23

## 2025-04-22 RX ADMIN — Medication 600 MILLIGRAM(S): at 17:45

## 2025-04-22 NOTE — ED PROVIDER NOTE - PROGRESS NOTE DETAILS
US shows a baker's cyst. Pt is well appearing walking with steady gait, stable for discharge and follow up without fail with medical doctor. I had a detailed discussion with the patient and/or guardian regarding the historical points, exam findings, and any diagnostic results supporting the discharge diagnosis. Pt educated on care and need for follow up. Strict return instructions and red flag signs and symptoms discussed with patient. Questions answered. Pt shows understanding of discharge information and agrees to follow.

## 2025-04-22 NOTE — ED PROVIDER NOTE - CLINICAL SUMMARY MEDICAL DECISION MAKING FREE TEXT BOX
54-year-old female with history of back surgery, hyperlipidemia, cholecystectomy presenting with nontraumatic right knee pain that began last night when she stood up.  Patient also reports a burning sensation in her right calf.  Took Tylenol with no relief.  Patient is able to ambulate    Will obtain xray to r/o fracture. Medications for pain. US to r/o DVT

## 2025-04-22 NOTE — ED PROVIDER NOTE - PATIENT PORTAL LINK FT
You can access the FollowMyHealth Patient Portal offered by [facility] by registering at the following website: [email or web address]. By joining [employer]’s FollowMyHealth portal, you will also be able to view your health information using other applications (apps) compatible with our system.

## 2025-04-22 NOTE — ED PROVIDER NOTE - NSFOLLOWUPINSTRUCTIONS_ED_ALL_ED_FT
Follow up with your primary care doctor within 1 week.     Follow up with an orthopedic doctor within 1 week.     You can take Motrin (ibuprofen) 600 mg every 6 hours or Tylenol (acetaminophen) 1000 mg every 6 hours as needed for pain or fever.     If you experience any new or worsening symptoms or if you are concerned you can always come back to the emergency for a re-evaluation.

## 2025-04-22 NOTE — ED ADULT NURSE NOTE - NSFALLUNIVINTERV_ED_ALL_ED
Bed/Stretcher in lowest position, wheels locked, appropriate side rails in place/Call bell, personal items and telephone in reach/Instruct patient to call for assistance before getting out of bed/chair/stretcher/Non-slip footwear applied when patient is off stretcher/[place] to call system/Physically safe environment - no spills, clutter or unnecessary equipment/Purposeful proactive rounding/Room/bathroom lighting operational, light cord in reach

## 2025-04-22 NOTE — ED PROVIDER NOTE - OBJECTIVE STATEMENT
54-year-old female with history of back surgery, hyperlipidemia, cholecystectomy presenting with nontraumatic right knee pain that began last night when she stood up.  Patient also reports a burning sensation in her right calf.  Took Tylenol with no relief.  Patient is able to ambulate.

## 2025-04-22 NOTE — ED PROVIDER NOTE - PHYSICAL EXAMINATION
Right knee - No discoloration, erythema, ecchymosis, deformity or swelling. Popliteal, dorsalis pedis and posterior tibial pulses equally strong 2+ bilaterally. Capillary refill in lower extremity < 2 seconds. Full range of motion during flexion, extension and hyperextension. No evidence of locked knee.  No patellar, femur or tibia tenderness on palpation. No crepitus. Able to bear weight. No joint laxity during varus and valgus stress test.     +right calf tenderness.